# Patient Record
Sex: MALE | Employment: FULL TIME | URBAN - METROPOLITAN AREA
[De-identification: names, ages, dates, MRNs, and addresses within clinical notes are randomized per-mention and may not be internally consistent; named-entity substitution may affect disease eponyms.]

---

## 2017-03-10 ENCOUNTER — HOSPITAL ENCOUNTER (INPATIENT)
Facility: CLINIC | Age: 57
LOS: 5 days | Discharge: HOME | DRG: 885 | End: 2017-03-15
Attending: PSYCHIATRY & NEUROLOGY | Admitting: PSYCHIATRY & NEUROLOGY
Payer: COMMERCIAL

## 2017-03-10 DIAGNOSIS — F33.2 SEVERE RECURRENT MAJOR DEPRESSION WITHOUT PSYCHOTIC FEATURES (H): Primary | ICD-10-CM

## 2017-03-10 DIAGNOSIS — T14.8XXA OPEN WOUND: ICD-10-CM

## 2017-03-10 PROBLEM — R45.851 DEPRESSION WITH SUICIDAL IDEATION: Status: ACTIVE | Noted: 2017-03-10

## 2017-03-10 PROBLEM — F32.A DEPRESSION WITH SUICIDAL IDEATION: Status: ACTIVE | Noted: 2017-03-10

## 2017-03-10 LAB
ANION GAP SERPL CALCULATED.3IONS-SCNC: 7 MMOL/L (ref 3–14)
BASOPHILS # BLD AUTO: 0.1 10E9/L (ref 0–0.2)
BASOPHILS NFR BLD AUTO: 0.6 %
BUN SERPL-MCNC: 16 MG/DL (ref 7–30)
CALCIUM SERPL-MCNC: 9.3 MG/DL (ref 8.5–10.1)
CHLORIDE SERPL-SCNC: 106 MMOL/L (ref 94–109)
CO2 SERPL-SCNC: 29 MMOL/L (ref 20–32)
CREAT SERPL-MCNC: 1.05 MG/DL (ref 0.66–1.25)
DIFFERENTIAL METHOD BLD: ABNORMAL
EOSINOPHIL # BLD AUTO: 0.3 10E9/L (ref 0–0.7)
EOSINOPHIL NFR BLD AUTO: 3.4 %
ERYTHROCYTE [DISTWIDTH] IN BLOOD BY AUTOMATED COUNT: 13.2 % (ref 10–15)
GFR SERPL CREATININE-BSD FRML MDRD: 73 ML/MIN/1.7M2
GLUCOSE SERPL-MCNC: 88 MG/DL (ref 70–99)
HCT VFR BLD AUTO: 38.2 % (ref 40–53)
HGB BLD-MCNC: 12.8 G/DL (ref 13.3–17.7)
IMM GRANULOCYTES # BLD: 0 10E9/L (ref 0–0.4)
IMM GRANULOCYTES NFR BLD: 0.2 %
LYMPHOCYTES # BLD AUTO: 2 10E9/L (ref 0.8–5.3)
LYMPHOCYTES NFR BLD AUTO: 23 %
MCH RBC QN AUTO: 29.2 PG (ref 26.5–33)
MCHC RBC AUTO-ENTMCNC: 33.5 G/DL (ref 31.5–36.5)
MCV RBC AUTO: 87 FL (ref 78–100)
MONOCYTES # BLD AUTO: 0.8 10E9/L (ref 0–1.3)
MONOCYTES NFR BLD AUTO: 9.8 %
NEUTROPHILS # BLD AUTO: 5.3 10E9/L (ref 1.6–8.3)
NEUTROPHILS NFR BLD AUTO: 63 %
NRBC # BLD AUTO: 0 10*3/UL
NRBC BLD AUTO-RTO: 0 /100
PLATELET # BLD AUTO: 208 10E9/L (ref 150–450)
POTASSIUM SERPL-SCNC: 4.1 MMOL/L (ref 3.4–5.3)
RBC # BLD AUTO: 4.39 10E12/L (ref 4.4–5.9)
SODIUM SERPL-SCNC: 142 MMOL/L (ref 133–144)
TSH SERPL DL<=0.005 MIU/L-ACNC: 1.45 MU/L (ref 0.4–4)
WBC # BLD AUTO: 8.5 10E9/L (ref 4–11)

## 2017-03-10 PROCEDURE — 25000132 ZZH RX MED GY IP 250 OP 250 PS 637: Performed by: PSYCHIATRY & NEUROLOGY

## 2017-03-10 PROCEDURE — 84443 ASSAY THYROID STIM HORMONE: CPT | Performed by: PSYCHIATRY & NEUROLOGY

## 2017-03-10 PROCEDURE — 36415 COLL VENOUS BLD VENIPUNCTURE: CPT | Performed by: PSYCHIATRY & NEUROLOGY

## 2017-03-10 PROCEDURE — 83550 IRON BINDING TEST: CPT | Performed by: PSYCHIATRY & NEUROLOGY

## 2017-03-10 PROCEDURE — 12400006 ZZH R&B MH INTERMEDIATE

## 2017-03-10 PROCEDURE — 80048 BASIC METABOLIC PNL TOTAL CA: CPT | Performed by: PSYCHIATRY & NEUROLOGY

## 2017-03-10 PROCEDURE — 99207 ZZC CONSULT E&M CHANGED TO INITIAL LEVEL: CPT | Performed by: INTERNAL MEDICINE

## 2017-03-10 PROCEDURE — 99222 1ST HOSP IP/OBS MODERATE 55: CPT | Performed by: INTERNAL MEDICINE

## 2017-03-10 PROCEDURE — 85025 COMPLETE CBC W/AUTO DIFF WBC: CPT | Performed by: PSYCHIATRY & NEUROLOGY

## 2017-03-10 PROCEDURE — 83540 ASSAY OF IRON: CPT | Performed by: PSYCHIATRY & NEUROLOGY

## 2017-03-10 RX ORDER — CEPHALEXIN 500 MG/1
500 CAPSULE ORAL EVERY 8 HOURS SCHEDULED
Status: DISCONTINUED | OUTPATIENT
Start: 2017-03-10 | End: 2017-03-15 | Stop reason: HOSPADM

## 2017-03-10 RX ORDER — FOLIC ACID 0.8 MG
500 TABLET ORAL DAILY
Status: ON HOLD | COMMUNITY
End: 2019-06-27

## 2017-03-10 RX ORDER — ESZOPICLONE 3 MG/1
3 TABLET, FILM COATED ORAL
Status: DISCONTINUED | OUTPATIENT
Start: 2017-03-10 | End: 2017-03-15 | Stop reason: HOSPADM

## 2017-03-10 RX ORDER — QUETIAPINE FUMARATE 25 MG/1
25 TABLET, FILM COATED ORAL EVERY 4 HOURS PRN
Status: DISCONTINUED | OUTPATIENT
Start: 2017-03-10 | End: 2017-03-15 | Stop reason: HOSPADM

## 2017-03-10 RX ORDER — MIRTAZAPINE 30 MG/1
30 TABLET, FILM COATED ORAL AT BEDTIME
Status: DISCONTINUED | OUTPATIENT
Start: 2017-03-10 | End: 2017-03-15 | Stop reason: HOSPADM

## 2017-03-10 RX ORDER — MAGNESIUM OXIDE 400 MG/1
400 TABLET ORAL DAILY
Status: DISCONTINUED | OUTPATIENT
Start: 2017-03-10 | End: 2017-03-15 | Stop reason: HOSPADM

## 2017-03-10 RX ORDER — FENOFIBRATE 160 MG/1
160 TABLET ORAL DAILY
Status: DISCONTINUED | OUTPATIENT
Start: 2017-03-10 | End: 2017-03-15 | Stop reason: HOSPADM

## 2017-03-10 RX ORDER — POTASSIUM CHLORIDE 1.5 G/1.58G
20 POWDER, FOR SOLUTION ORAL DAILY
Status: ON HOLD | COMMUNITY
End: 2019-06-27

## 2017-03-10 RX ORDER — HYDROCHLOROTHIAZIDE 12.5 MG/1
12.5 CAPSULE ORAL EVERY MORNING
Status: DISCONTINUED | OUTPATIENT
Start: 2017-03-11 | End: 2017-03-15 | Stop reason: HOSPADM

## 2017-03-10 RX ADMIN — VORTIOXETINE 20 MG: 20 TABLET, FILM COATED ORAL at 17:38

## 2017-03-10 RX ADMIN — CEPHALEXIN 500 MG: 500 CAPSULE ORAL at 21:56

## 2017-03-10 RX ADMIN — CEPHALEXIN 500 MG: 500 CAPSULE ORAL at 18:20

## 2017-03-10 RX ADMIN — MAGNESIUM OXIDE TAB 400 MG (241.3 MG ELEMENTAL MG) 400 MG: 400 (241.3 MG) TAB at 21:56

## 2017-03-10 RX ADMIN — FENOFIBRATE 160 MG: 160 TABLET ORAL at 17:38

## 2017-03-10 RX ADMIN — MIRTAZAPINE 30 MG: 30 TABLET, FILM COATED ORAL at 21:56

## 2017-03-10 NOTE — PROGRESS NOTES
Nursing assessment complete including patient and medication profiles. Risk assessments completed addressing suicide,fall,skin,nutrition and safety issues. Care plan initiated. Assessments reviewed with physician and admit orders received. Welcome packet reviewed with patient. Information reviewed includes getting emergency help, preventing infections, understanding your care, using medication safely, reducing falls, preventing pressure ulcers, smoking cessation, powerful choices and Patients Bill of Rights. Pt. given tour of the unit and instruction on use of facility including emergency call light. Program schedule reviewed with patient. Questions regarding the unit addressed. Pt. Search completed and belongings inventoried.

## 2017-03-10 NOTE — IP AVS SNAPSHOT
[place] [address] [place] [address]    Phone:  [phone]                                       After Visit Summary   3/10/2017    Bull Garza    MRN: [id number]           After Visit Summary Signature Page     I have received my discharge instructions, and my questions have been answered. I have discussed any challenges I see with this plan with the nurse or doctor.    ..........................................................................................................................................  Patient/Patient Representative Signature      ..........................................................................................................................................  Patient Representative Print Name and Relationship to Patient    ..................................................               ................................................  Date                                            Time    ..........................................................................................................................................  Reviewed by Signature/Title    ...................................................              ..............................................  Date                                                            Time
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
## 2017-03-10 NOTE — PROGRESS NOTES
03/10/17 1515   Patient Belongings   Did you bring any home meds/supplements to the hospital?  No   Patient Belongings other (see comments)   Disposition of Belongings Put in pt's locker   Belongings Search Yes   Clothing Search Yes   Second Staff Lazaro Sorenson w/string  Socks  Phone  Polo shirt  Jeans  Belt          Admission Signature____________________________             Date_____________      Discharge Signature____________________________             Date_____________

## 2017-03-11 LAB
IRON SATN MFR SERPL: 10 % (ref 15–46)
IRON SERPL-MCNC: 44 UG/DL (ref 35–180)
TIBC SERPL-MCNC: 429 UG/DL (ref 240–430)

## 2017-03-11 PROCEDURE — 12400000 ZZH R&B MH

## 2017-03-11 PROCEDURE — 93005 ELECTROCARDIOGRAM TRACING: CPT

## 2017-03-11 PROCEDURE — 25000132 ZZH RX MED GY IP 250 OP 250 PS 637: Performed by: PSYCHIATRY & NEUROLOGY

## 2017-03-11 PROCEDURE — 25000132 ZZH RX MED GY IP 250 OP 250 PS 637: Performed by: INTERNAL MEDICINE

## 2017-03-11 PROCEDURE — 93010 ELECTROCARDIOGRAM REPORT: CPT | Performed by: INTERNAL MEDICINE

## 2017-03-11 RX ORDER — IBUPROFEN 600 MG/1
600 TABLET, FILM COATED ORAL EVERY 6 HOURS PRN
Status: DISCONTINUED | OUTPATIENT
Start: 2017-03-11 | End: 2017-03-15 | Stop reason: HOSPADM

## 2017-03-11 RX ADMIN — FENOFIBRATE 160 MG: 160 TABLET ORAL at 09:25

## 2017-03-11 RX ADMIN — VORTIOXETINE 20 MG: 20 TABLET, FILM COATED ORAL at 09:25

## 2017-03-11 RX ADMIN — BREXPIPRAZOLE 0.5 MG: 0.5 TABLET ORAL at 16:06

## 2017-03-11 RX ADMIN — MAGNESIUM OXIDE TAB 400 MG (241.3 MG ELEMENTAL MG) 400 MG: 400 (241.3 MG) TAB at 21:22

## 2017-03-11 RX ADMIN — CEPHALEXIN 500 MG: 500 CAPSULE ORAL at 13:25

## 2017-03-11 RX ADMIN — CEPHALEXIN 500 MG: 500 CAPSULE ORAL at 21:22

## 2017-03-11 RX ADMIN — IBUPROFEN 600 MG: 600 TABLET ORAL at 10:23

## 2017-03-11 RX ADMIN — RANITIDINE 150 MG: 150 TABLET ORAL at 21:22

## 2017-03-11 RX ADMIN — RANITIDINE 150 MG: 150 TABLET ORAL at 11:18

## 2017-03-11 RX ADMIN — CEPHALEXIN 500 MG: 500 CAPSULE ORAL at 06:55

## 2017-03-11 RX ADMIN — MIRTAZAPINE 30 MG: 30 TABLET, FILM COATED ORAL at 21:22

## 2017-03-11 RX ADMIN — HYDROCHLOROTHIAZIDE 12.5 MG: 12.5 CAPSULE ORAL at 09:25

## 2017-03-11 NOTE — PLAN OF CARE
"Problem: Depressive Symptoms  Goal: Depressive Symptoms  Signs and symptoms of listed problems will be absent or manageable.   Outcome: No Change  Patient is very isolative.  He is flat, sad and blunt.  He is depressed and states \"I am just really really depressed\".  Not attending groups.  He is cooperative.  Wound on Left leg is dry and red around the area.  Patient states that it is getting better.      "

## 2017-03-11 NOTE — PROGRESS NOTES
Pt was not clear with his PTA meds.  We looked at his My Chart for some clarity.  He reports Potassium Chloride Tameka ERO 20 MEQ oral tablet extended daily for a hx of hypokalemia and leg cramps.  Per MD, wait for his K+ lab before ordering.   He also reported taking a high dose of Magnesium for leg cramps but it was ordered for 400mg daily.  Antibiotic was ordered for his left leg laceration.      Pt also needs to be cleared for ECT.

## 2017-03-11 NOTE — CONSULTS
Hospitalist/Internal Medicine consult dictated (#[id number])    Karlos Mendes MD   March 11, 2017   2494

## 2017-03-11 NOTE — H&P
Pt seen for initial psychiatric evaluation, please see my dictation for details and recommendations. Dr. Hill
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
## 2017-03-11 NOTE — PROGRESS NOTES
Hospitalist follow up:    12-Lead EKG: Reviewed. Sinus bradycardia. Normal axis and intervals. No significant ST-T wave abnormalities or acute ischemic changes.     Patient is medically cleared for ECT.     Karlos Mendes MD   March 11, 2017   [id number]

## 2017-03-12 PROBLEM — F33.2 SEVERE RECURRENT MAJOR DEPRESSION WITHOUT PSYCHOTIC FEATURES (H): Status: ACTIVE | Noted: 2017-03-12

## 2017-03-12 PROCEDURE — 25000132 ZZH RX MED GY IP 250 OP 250 PS 637: Performed by: PSYCHIATRY & NEUROLOGY

## 2017-03-12 PROCEDURE — 12400000 ZZH R&B MH

## 2017-03-12 PROCEDURE — 25000132 ZZH RX MED GY IP 250 OP 250 PS 637: Performed by: INTERNAL MEDICINE

## 2017-03-12 RX ADMIN — MIRTAZAPINE 30 MG: 30 TABLET, FILM COATED ORAL at 21:26

## 2017-03-12 RX ADMIN — CEPHALEXIN 500 MG: 500 CAPSULE ORAL at 13:57

## 2017-03-12 RX ADMIN — CEPHALEXIN 500 MG: 500 CAPSULE ORAL at 21:26

## 2017-03-12 RX ADMIN — CEPHALEXIN 500 MG: 500 CAPSULE ORAL at 07:13

## 2017-03-12 RX ADMIN — RANITIDINE 150 MG: 150 TABLET ORAL at 07:45

## 2017-03-12 RX ADMIN — FENOFIBRATE 160 MG: 160 TABLET ORAL at 07:45

## 2017-03-12 RX ADMIN — BREXPIPRAZOLE 0.5 MG: 0.5 TABLET ORAL at 07:45

## 2017-03-12 RX ADMIN — MAGNESIUM OXIDE TAB 400 MG (241.3 MG ELEMENTAL MG) 400 MG: 400 (241.3 MG) TAB at 21:26

## 2017-03-12 RX ADMIN — VORTIOXETINE 20 MG: 20 TABLET, FILM COATED ORAL at 07:45

## 2017-03-12 RX ADMIN — HYDROCHLOROTHIAZIDE 12.5 MG: 12.5 CAPSULE ORAL at 07:45

## 2017-03-12 RX ADMIN — RANITIDINE 150 MG: 150 TABLET ORAL at 21:26

## 2017-03-12 NOTE — PROGRESS NOTES
BEHAVIORAL HEALTH NUTRITION ASSESSMENT      REASON FOR ASSESSMENT:  Admission screen: Unintentional weight loss of 10# or more in past 2 months  Admitted for depression, note plans for ECT.    CURRENT DIET AND NOURISHMENT ORDER:    Diet: Regular    Current Intake/Tolerance: No intake recorded on the flowsheet. Patient not available/not appropriate to visit with at this time. Patient ordering adequate amounts of food and balanced meals as per review of menus.      ANTHROPOMETRICS:    Height: 6'  Weight: 101.2 kg  BMI: 30.32 kg/m2  IBW: 81 kg +/- 10%  %IBW: 125%  Weight History: No recent wt hx  Wt Readings from Last 10 Encounters:   03/10/17 101.2 kg (223 lb 1.6 oz)   05/27/14 111.7 kg (246 lb 3.2 oz)   05/21/14 111.1 kg (245 lb)   07/22/13 109.8 kg (242 lb)   08/01/11 103 kg (227 lb)   05/19/11 103.9 kg (229 lb)   08/19/10 114.3 kg (252 lb)   08/05/10 116.1 kg (256 lb)   07/07/10 116.1 kg (256 lb)   05/26/10 115.1 kg (253 lb 12.8 oz)         LABS:  Reviewed    NUTRITION STATUS VALIDATION:  Weight status: Obesity Grade I BMI 30-34.9    INTERVENTION:    Nutrition Diagnosis:  No nutrition diagnosis at this time.    Implementation:   Nutrition education: Per MD order if appropriate    Follow Up/Monitoring:   No need for further follow-up unless another consult received.    Elena Donis RD  Pager [phone] (M-F)            [phone] (W/E & Hol)

## 2017-03-12 NOTE — PLAN OF CARE
Problem: Depressive Symptoms  Goal: Depressive Symptoms  Signs and symptoms of listed problems will be absent or manageable.   Outcome: No Change  Patient is very depressed.  He did volunteer on his own to take a shower.  Mainly spends time in his beds and does not attend group.  Covered wound with telfa after his shower and ordered a wound consult as the wound is deep.  Redness is decreasing. Area around the wound is hard.

## 2017-03-13 ENCOUNTER — ANESTHESIA (OUTPATIENT)
Dept: SURGERY | Facility: CLINIC | Age: 57
End: 2017-03-13

## 2017-03-13 ENCOUNTER — ANESTHESIA EVENT (OUTPATIENT)
Dept: SURGERY | Facility: CLINIC | Age: 57
End: 2017-03-13

## 2017-03-13 PROCEDURE — 25800025 ZZH RX 258: Performed by: ANESTHESIOLOGY

## 2017-03-13 PROCEDURE — 99212 OFFICE O/P EST SF 10 MIN: CPT

## 2017-03-13 PROCEDURE — 25000128 H RX IP 250 OP 636: Performed by: PSYCHIATRY & NEUROLOGY

## 2017-03-13 PROCEDURE — 25000132 ZZH RX MED GY IP 250 OP 250 PS 637: Performed by: PSYCHIATRY & NEUROLOGY

## 2017-03-13 PROCEDURE — 40000671 ZZH STATISTIC ANESTHESIA CASE

## 2017-03-13 PROCEDURE — 25000125 ZZHC RX 250: Performed by: ANESTHESIOLOGY

## 2017-03-13 PROCEDURE — 12400006 ZZH R&B MH INTERMEDIATE

## 2017-03-13 PROCEDURE — 25000125 ZZHC RX 250: Performed by: NURSE ANESTHETIST, CERTIFIED REGISTERED

## 2017-03-13 PROCEDURE — 25000132 ZZH RX MED GY IP 250 OP 250 PS 637: Performed by: INTERNAL MEDICINE

## 2017-03-13 PROCEDURE — 90870 ELECTROCONVULSIVE THERAPY: CPT

## 2017-03-13 RX ORDER — ONDANSETRON 2 MG/ML
4 INJECTION INTRAMUSCULAR; INTRAVENOUS EVERY 6 HOURS PRN
Status: DISCONTINUED | OUTPATIENT
Start: 2017-03-13 | End: 2017-03-15 | Stop reason: HOSPADM

## 2017-03-13 RX ORDER — KETOROLAC TROMETHAMINE 30 MG/ML
30 INJECTION, SOLUTION INTRAMUSCULAR; INTRAVENOUS EVERY 6 HOURS PRN
Status: DISCONTINUED | OUTPATIENT
Start: 2017-03-13 | End: 2017-03-15 | Stop reason: HOSPADM

## 2017-03-13 RX ORDER — ACETAMINOPHEN 325 MG/1
650 TABLET ORAL EVERY 4 HOURS PRN
Status: DISCONTINUED | OUTPATIENT
Start: 2017-03-13 | End: 2017-03-15 | Stop reason: HOSPADM

## 2017-03-13 RX ORDER — SODIUM CHLORIDE, SODIUM LACTATE, POTASSIUM CHLORIDE, CALCIUM CHLORIDE 600; 310; 30; 20 MG/100ML; MG/100ML; MG/100ML; MG/100ML
500 INJECTION, SOLUTION INTRAVENOUS CONTINUOUS
Status: DISCONTINUED | OUTPATIENT
Start: 2017-03-13 | End: 2017-03-13

## 2017-03-13 RX ADMIN — BREXPIPRAZOLE 0.5 MG: 0.5 TABLET ORAL at 08:41

## 2017-03-13 RX ADMIN — CEPHALEXIN 500 MG: 500 CAPSULE ORAL at 15:05

## 2017-03-13 RX ADMIN — RANITIDINE 150 MG: 150 TABLET ORAL at 21:34

## 2017-03-13 RX ADMIN — ACETAMINOPHEN 650 MG: 325 TABLET, FILM COATED ORAL at 12:08

## 2017-03-13 RX ADMIN — HYDROCHLOROTHIAZIDE 12.5 MG: 12.5 CAPSULE ORAL at 08:42

## 2017-03-13 RX ADMIN — KETOROLAC TROMETHAMINE 30 MG: 30 INJECTION, SOLUTION INTRAMUSCULAR at 06:30

## 2017-03-13 RX ADMIN — ONDANSETRON 4 MG: 2 INJECTION INTRAMUSCULAR; INTRAVENOUS at 06:30

## 2017-03-13 RX ADMIN — MAGNESIUM OXIDE TAB 400 MG (241.3 MG ELEMENTAL MG) 400 MG: 400 (241.3 MG) TAB at 21:34

## 2017-03-13 RX ADMIN — VORTIOXETINE 20 MG: 20 TABLET, FILM COATED ORAL at 08:42

## 2017-03-13 RX ADMIN — MIRTAZAPINE 30 MG: 30 TABLET, FILM COATED ORAL at 21:34

## 2017-03-13 RX ADMIN — SUCCINYLCHOLINE CHLORIDE 100 MG: 20 INJECTION, SOLUTION INTRAMUSCULAR; INTRAVENOUS at 07:31

## 2017-03-13 RX ADMIN — RANITIDINE 150 MG: 150 TABLET ORAL at 08:41

## 2017-03-13 RX ADMIN — CEPHALEXIN 500 MG: 500 CAPSULE ORAL at 08:42

## 2017-03-13 RX ADMIN — CEPHALEXIN 500 MG: 500 CAPSULE ORAL at 21:34

## 2017-03-13 RX ADMIN — LIDOCAINE HYDROCHLORIDE 1 ML: 10 INJECTION, SOLUTION EPIDURAL; INFILTRATION; INTRACAUDAL; PERINEURAL at 06:15

## 2017-03-13 RX ADMIN — METHOHEXITAL SODIUM 120 MG: 500 INJECTION, POWDER, LYOPHILIZED, FOR SOLUTION INTRAMUSCULAR; INTRAVENOUS; RECTAL at 07:31

## 2017-03-13 RX ADMIN — IBUPROFEN 600 MG: 600 TABLET ORAL at 13:15

## 2017-03-13 RX ADMIN — FENOFIBRATE 160 MG: 160 TABLET ORAL at 08:41

## 2017-03-13 RX ADMIN — SODIUM CHLORIDE, POTASSIUM CHLORIDE, SODIUM LACTATE AND CALCIUM CHLORIDE 500 ML: 600; 310; 30; 20 INJECTION, SOLUTION INTRAVENOUS at 06:15

## 2017-03-13 ASSESSMENT — LIFESTYLE VARIABLES: TOBACCO_USE: 0

## 2017-03-13 ASSESSMENT — ENCOUNTER SYMPTOMS: SEIZURES: 0
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
## 2017-03-13 NOTE — ANESTHESIA POSTPROCEDURE EVALUATION
Patient: Bull Garza    * No procedures listed *    Diagnosis:* No pre-op diagnosis entered *  Diagnosis Additional Information: No value filed.    Anesthesia Type:  General    Note:  Anesthesia Post Evaluation    Patient location during evaluation: PACU  Patient participation: Able to fully participate in evaluation  Level of consciousness: awake  Airway patency: patent  Cardiovascular status: acceptable  Respiratory status: acceptable  Hydration status: acceptable     Anesthetic complications: None          Last vitals:  Vitals:    03/13/17 0810 03/13/17 0815 03/13/17 0820   BP: 143/81 141/76 132/69   Pulse:      Resp: 12 16 11   Temp:      SpO2: 93% 94% 94%         Electronically Signed By: Rebecca Saeed  March 13, 2017  2:52 PM

## 2017-03-13 NOTE — PROCEDURES
[facility] ECT Procedure Note     Bull Garza [id number]   56 year old 1960     Patient Status: Inpatient    Allergies   Allergen Reactions     Nka [No Known Allergies]        Weight:  223 lbs 1.6 oz              Diagnosis:   Major depression       Indications for ECT:   Medications ineffective       Pause for the Cause:     Right patient Yes   Right procedure/laterality settings: Yes   Right diagnosis Yes          Intra-Procedure Documentation:     Date:  3/13/2017  Time:  6:31 AM    ECT #    Treatment number this series: 1   Total treatment number: 7   Type of ECT:  Bilateral, standard    ECT Medications administered: Brevital: 120mg  Succinyl Choline: 100mg         Clinical Narrative:     ECT was administered by Thymatron machine.  Pt has been medically cleared for procedure, consent signed. Side effects, Risks and benefits reviewed.    ECT Strip Summary:   Energy Level: 50 percent  Motor Seizure Duration: 30 seconds  EEG Seizure Duration: 30 seconds    Complications: No    Plan: 2nd ECT 3/15/17  Outpatient Psychiatrist: Dr Hill
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
## 2017-03-13 NOTE — ANESTHESIA PREPROCEDURE EVALUATION
Anesthesia Evaluation     . Pt has had prior anesthetic.     No history of anesthetic complications     ROS/MED HX    ENT/Pulmonary:     (+)sleep apnea, doesn't use CPAP , . .   (-) tobacco use   Neurologic: Comment: Hx of tbi     (-) seizures and CVA   Cardiovascular:     (+) hypertension----. : . . . :. Irregular Heartbeat/Palpitations (hx of ablation for atrial tachycardia), .       METS/Exercise Tolerance:     Hematologic:         Musculoskeletal:         GI/Hepatic:  - neg GI/hepatic ROS       Renal/Genitourinary:         Endo:      (-) Type II DM and thyroid disease   Psychiatric:         Infectious Disease:         Malignancy:         Other:               Physical Exam  Normal systems: dental    Airway   Mallampati: I  TM distance: >3 FB  Neck ROM: full    Dental     Cardiovascular   Rhythm and rate: regular      Pulmonary    breath sounds clear to auscultation                    Anesthesia Plan      History & Physical Review  History and physical reviewed and following examination; no interval change.    ASA Status:  2 .        Plan for General with Intravenous induction.          Postoperative Care  Postoperative pain management:  IV analgesics.      Consents  Anesthetic plan, risks, benefits and alternatives discussed with:  Patient..                          .

## 2017-03-13 NOTE — ANESTHESIA CARE TRANSFER NOTE
Patient: Bull Garza    * No procedures listed *    Diagnosis: * No pre-op diagnosis entered *  Diagnosis Additional Information: No value filed.    Anesthesia Type:   General     Note:  Airway :Nasal Cannula  Patient transferred to:PACU  Comments: Transferred to PACU, spontaneous respirations, 4L oxygen via nasal cannula.  All monitors and alarms on and functioning, VSS.  Patient awake, comfortable.  Report to PACU RN.      Vitals: (Last set prior to Anesthesia Care Transfer)    CRNA VITALS  3/13/2017 0714 - 3/13/2017 0744      3/13/2017             NIBP: (!)  194/95    Pulse: 111    NIBP Mean: 118    SpO2: (!)  99 %    Resp Rate (observed): (!)  12    Resp Rate (set): 10                Electronically Signed By: TRISHA Klein CRNA  March 13, 2017  7:44 AM
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
## 2017-03-13 NOTE — PLAN OF CARE
Problem: Depressive Symptoms  Goal: Depressive Symptoms  Signs and symptoms of listed problems will be absent or manageable.   Outcome: Improving  Patient had 1st ECT today.  Complaining of a headache and did take tylenol, Ibuprofen and using an ice pack.  Spending most of the day in bed.

## 2017-03-14 LAB — INTERPRETATION ECG - MUSE: NORMAL

## 2017-03-14 PROCEDURE — 97150 GROUP THERAPEUTIC PROCEDURES: CPT | Mod: GO

## 2017-03-14 PROCEDURE — 12400006 ZZH R&B MH INTERMEDIATE

## 2017-03-14 PROCEDURE — 25000132 ZZH RX MED GY IP 250 OP 250 PS 637: Performed by: PSYCHIATRY & NEUROLOGY

## 2017-03-14 PROCEDURE — 90853 GROUP PSYCHOTHERAPY: CPT

## 2017-03-14 PROCEDURE — 25000132 ZZH RX MED GY IP 250 OP 250 PS 637: Performed by: INTERNAL MEDICINE

## 2017-03-14 RX ADMIN — RANITIDINE 150 MG: 150 TABLET ORAL at 21:19

## 2017-03-14 RX ADMIN — CEPHALEXIN 500 MG: 500 CAPSULE ORAL at 14:44

## 2017-03-14 RX ADMIN — CEPHALEXIN 500 MG: 500 CAPSULE ORAL at 06:04

## 2017-03-14 RX ADMIN — FENOFIBRATE 160 MG: 160 TABLET ORAL at 08:06

## 2017-03-14 RX ADMIN — VORTIOXETINE 20 MG: 20 TABLET, FILM COATED ORAL at 08:07

## 2017-03-14 RX ADMIN — RANITIDINE 150 MG: 150 TABLET ORAL at 08:06

## 2017-03-14 RX ADMIN — BREXPIPRAZOLE 0.5 MG: 0.5 TABLET ORAL at 08:06

## 2017-03-14 RX ADMIN — MIRTAZAPINE 30 MG: 30 TABLET, FILM COATED ORAL at 21:19

## 2017-03-14 RX ADMIN — MAGNESIUM OXIDE TAB 400 MG (241.3 MG ELEMENTAL MG) 400 MG: 400 (241.3 MG) TAB at 21:19

## 2017-03-14 RX ADMIN — CEPHALEXIN 500 MG: 500 CAPSULE ORAL at 21:19

## 2017-03-14 RX ADMIN — HYDROCHLOROTHIAZIDE 12.5 MG: 12.5 CAPSULE ORAL at 08:06

## 2017-03-14 NOTE — PLAN OF CARE
Problem: Depressive Symptoms  Goal: Depressive Symptoms  Signs and symptoms of listed problems will be absent or manageable.   Outcome: Improving  Pleasant and cooperative. Spent the beginning of the shift bed resting.  Later spent time in the lounge watching tv.  Said he was feeling better this evening.  He reported ECT going OK but he c/o a HA and feeling tired.  Discussed experiencing pent up frustration and anger.  He felt that is was so bead the littlest thing would set him off and felt as though he should take it out on someone, typically himself.  He also felt like he should punish himself.  He was disappointed he waited so long to address his issues but talked about not being able to get a med in time d/t insurance issues.  Pt realized today that he actual needs to be here.  Cont to appear depressed.

## 2017-03-14 NOTE — PROGRESS NOTES
"Wound care performed @ 1100. Patient tolerated procedure no complaints of pain. Periwound cleaned with MicroKlenz, packed with 2\" of idoform packing guaze, and covered with Mepilex. Assessed wound, scant amount of serosanguinous drainage, bruising and scarring around the wound, pt. Stated that he showered this morning and got the dressing wet, encouraged patient to notify nurse when showering so that dressing change can be performed immediately after shower.   "

## 2017-03-14 NOTE — PLAN OF CARE
Problem: Depressive Symptoms  Goal: Depressive Symptoms  Signs and symptoms of listed problems will be absent or manageable.   Outcome: Therapy, progress toward functional goals is gradual  Pt is attending groups. Showered. Keeps to self, not social. Eating well, Visible on the unit. Appears sad, depressed,calm.

## 2017-03-14 NOTE — PLAN OF CARE
Problem: Goal Outcome Summary  Goal: Goal Outcome Summary  OT: Pt has not attended OT groups as of this date. Pt isolated to room primarily. Pt observed reading on bed and well-groomed.

## 2017-03-14 NOTE — PROGRESS NOTES
[facility] Psychiatric Progress Note       Interim History   The patient's care was discussed with the treatment team and chart notes were reviewed. Pt seen on SDU. Tolerating medications without side effects. Side effects, risks, and benefits of medications reviewed with patient. Pt endorsed having a headache later in the day from ECT yesterday. Dr. Hill informed him that this will subside with subsequent treatments. Pt appears slightly less depressed and anxious, able to smile more often. Pt would like to continue his ECT course outpatient as his wife is willing to take him in the mornings. Will reassess after 2nd ECT tomorrow if he is able to do this. Plan for second ECT on 3/15/17.   Medications     Current Facility-Administered Medications:    ranitidine  150 mg Oral BID     brexpiprazole  0.5 mg Oral Daily     fenofibrate  160 mg Oral Daily     hydrochlorothiazide  12.5 mg Oral QAM     magnesium oxide  400 mg Oral Daily     mirtazapine  30 mg Oral At Bedtime     vortioxetine  20 mg Oral Daily     cephalexin  500 mg Oral Q8H KELLIE     PRNs:  ketorolac, ondansetron, lidocaine, acetaminophen, ibuprofen, eszopiclone (LUNESTA) tablet 3 mg, QUEtiapine      Allergies      Allergies   Allergen Reactions     Nka [No Known Allergies]         Medical Review of Systems   /66  Pulse 80  Temp 98.3  F (36.8  C) (Oral)  Resp 16  Ht 1.829 m (6')  Wt 101.2 kg (223 lb 1.6 oz)  SpO2 94%  BMI 30.26 kg/m2  Body mass index is 30.26 kg/(m^2).  A 10-point review of systems was performed by Dr. Hill and is negative, no new findings.      Psychiatric Examination     Appearance Lying in bed, dressed in scrubs. Appears stated age.   Attitude Cooperative   Orientation Oriented to person, place, time   Eye Contact Poor   Speech Regular rate, rhythm, volume and tone   Language Normal   Psychomotor Behavior Normal   Mood Slighly less depressed   Affect Less flat   Thought Process Goal-Oriented, Intact    Associations Intact   Thought Content Patient is currently negative for suicide ideation, negative for plan or intent, able to contract no self harm and identify barriers to suicide.  Negative for obsessions, compulsions or psychosis.      Fund of Knowledge Average   Insight Impaired   Judgement Slightly improved   Attention Span & Concentration Intact   Recent & Remote Memory Intact   Gait Normal        Labs   Labs reviewed.  No results found for this or any previous visit (from the past 24 hour(s)).       Impression   Mr. Garza is a 56-year-old   male with a long history of depression and borderline personality disorder who had stopped his medications, which have been effective for several years. He had been on Viibryd, Trintellix, Remeron, Seroquel and Lunesta. The patient came in saying he was irritable and explosive, neglected to tell doctor that he had stopped all of his meds except for Trintellix. The patient was prescribed Depakote for irritability; he never took it. Came back to Dr. Hill's office suicidal, was then referred for direct admission to [facility] by Dr. Hill and clear for ECT, has had effective treatment in the past.    Diagnoses   1. Major depression, recurrent, severe, without psychotic features.  2. Borderline Personality Disorder.     Plan     1. Explained side effects, benefits, and complications of medications to the patient, Pt gave verbal consent.  2. Medication changes: None.  3. Discussed treatment plan with patient and team.  4. Projected length of stay: Until ECT course has completed.  5. Plan for second ECT on 3/15/17.    Attestation:   Patient has been seen and evaluated by me, Jose Hill MD.    Patient ID:  Name: Bull Garza  MRN: [id number]  Admission: 3/10/2017   YOB: 1960

## 2017-03-15 ENCOUNTER — ANESTHESIA EVENT (OUTPATIENT)
Dept: SURGERY | Facility: CLINIC | Age: 57
End: 2017-03-15

## 2017-03-15 ENCOUNTER — ANESTHESIA (OUTPATIENT)
Dept: SURGERY | Facility: CLINIC | Age: 57
End: 2017-03-15

## 2017-03-15 VITALS
WEIGHT: 222 LBS | DIASTOLIC BLOOD PRESSURE: 67 MMHG | BODY MASS INDEX: 30.07 KG/M2 | HEIGHT: 72 IN | RESPIRATION RATE: 16 BRPM | SYSTOLIC BLOOD PRESSURE: 123 MMHG | TEMPERATURE: 98.5 F | OXYGEN SATURATION: 95 % | HEART RATE: 52 BPM

## 2017-03-15 PROCEDURE — 25000128 H RX IP 250 OP 636: Performed by: NURSE ANESTHETIST, CERTIFIED REGISTERED

## 2017-03-15 PROCEDURE — 90870 ELECTROCONVULSIVE THERAPY: CPT

## 2017-03-15 PROCEDURE — 25000125 ZZHC RX 250: Performed by: NURSE ANESTHETIST, CERTIFIED REGISTERED

## 2017-03-15 PROCEDURE — 25800025 ZZH RX 258: Performed by: ANESTHESIOLOGY

## 2017-03-15 PROCEDURE — 25000128 H RX IP 250 OP 636: Performed by: PSYCHIATRY & NEUROLOGY

## 2017-03-15 PROCEDURE — 25000132 ZZH RX MED GY IP 250 OP 250 PS 637: Performed by: PSYCHIATRY & NEUROLOGY

## 2017-03-15 PROCEDURE — 25000132 ZZH RX MED GY IP 250 OP 250 PS 637: Performed by: INTERNAL MEDICINE

## 2017-03-15 PROCEDURE — GZB2ZZZ ELECTROCONVULSIVE THERAPY, BILATERAL-SINGLE SEIZURE: ICD-10-PCS | Performed by: PSYCHIATRY & NEUROLOGY

## 2017-03-15 PROCEDURE — 40000671 ZZH STATISTIC ANESTHESIA CASE

## 2017-03-15 RX ORDER — LABETALOL HYDROCHLORIDE 5 MG/ML
INJECTION, SOLUTION INTRAVENOUS PRN
Status: DISCONTINUED | OUTPATIENT
Start: 2017-03-15 | End: 2017-03-15

## 2017-03-15 RX ORDER — ONDANSETRON 2 MG/ML
4 INJECTION INTRAMUSCULAR; INTRAVENOUS EVERY 6 HOURS PRN
Status: DISCONTINUED | OUTPATIENT
Start: 2017-03-15 | End: 2017-03-15 | Stop reason: HOSPADM

## 2017-03-15 RX ORDER — ONDANSETRON 2 MG/ML
4 INJECTION INTRAMUSCULAR; INTRAVENOUS EVERY 6 HOURS PRN
Status: CANCELLED
Start: 2017-03-15

## 2017-03-15 RX ORDER — KETOROLAC TROMETHAMINE 30 MG/ML
30 INJECTION, SOLUTION INTRAMUSCULAR; INTRAVENOUS EVERY 6 HOURS PRN
Status: DISCONTINUED | OUTPATIENT
Start: 2017-03-15 | End: 2017-03-15 | Stop reason: HOSPADM

## 2017-03-15 RX ORDER — SODIUM CHLORIDE, SODIUM LACTATE, POTASSIUM CHLORIDE, CALCIUM CHLORIDE 600; 310; 30; 20 MG/100ML; MG/100ML; MG/100ML; MG/100ML
INJECTION, SOLUTION INTRAVENOUS ONCE
Status: COMPLETED | OUTPATIENT
Start: 2017-03-15 | End: 2017-03-15

## 2017-03-15 RX ORDER — CEPHALEXIN 500 MG/1
500 CAPSULE ORAL EVERY 8 HOURS
Qty: 5 CAPSULE | Refills: 0 | Status: ON HOLD | OUTPATIENT
Start: 2017-03-15 | End: 2019-06-27

## 2017-03-15 RX ORDER — KETOROLAC TROMETHAMINE 30 MG/ML
30 INJECTION, SOLUTION INTRAMUSCULAR; INTRAVENOUS EVERY 6 HOURS PRN
Status: CANCELLED
Start: 2017-03-15

## 2017-03-15 RX ADMIN — ONDANSETRON 4 MG: 2 INJECTION INTRAMUSCULAR; INTRAVENOUS at 06:21

## 2017-03-15 RX ADMIN — SUCCINYLCHOLINE CHLORIDE 100 MG: 20 INJECTION, SOLUTION INTRAMUSCULAR; INTRAVENOUS at 06:47

## 2017-03-15 RX ADMIN — HYDROCHLOROTHIAZIDE 12.5 MG: 12.5 CAPSULE ORAL at 08:01

## 2017-03-15 RX ADMIN — METHOHEXITAL SODIUM 120 MG: 500 INJECTION, POWDER, LYOPHILIZED, FOR SOLUTION INTRAMUSCULAR; INTRAVENOUS; RECTAL at 06:47

## 2017-03-15 RX ADMIN — LABETALOL HYDROCHLORIDE 25 MG: 5 INJECTION, SOLUTION INTRAVENOUS at 06:54

## 2017-03-15 RX ADMIN — VORTIOXETINE 20 MG: 20 TABLET, FILM COATED ORAL at 08:01

## 2017-03-15 RX ADMIN — KETOROLAC TROMETHAMINE 30 MG: 30 INJECTION, SOLUTION INTRAMUSCULAR at 06:21

## 2017-03-15 RX ADMIN — SODIUM CHLORIDE, POTASSIUM CHLORIDE, SODIUM LACTATE AND CALCIUM CHLORIDE: 600; 310; 30; 20 INJECTION, SOLUTION INTRAVENOUS at 06:44

## 2017-03-15 RX ADMIN — RANITIDINE 150 MG: 150 TABLET ORAL at 08:01

## 2017-03-15 RX ADMIN — BREXPIPRAZOLE 0.5 MG: 0.5 TABLET ORAL at 08:01

## 2017-03-15 RX ADMIN — FENOFIBRATE 160 MG: 160 TABLET ORAL at 08:01

## 2017-03-15 ASSESSMENT — ENCOUNTER SYMPTOMS: SEIZURES: 0

## 2017-03-15 ASSESSMENT — LIFESTYLE VARIABLES: TOBACCO_USE: 0

## 2017-03-15 NOTE — ANESTHESIA PREPROCEDURE EVALUATION
Anesthesia Evaluation     . Pt has had prior anesthetic.     No history of anesthetic complications     ROS/MED HX    ENT/Pulmonary:     (+)sleep apnea, doesn't use CPAP , . .   (-) tobacco use   Neurologic: Comment: Hx of tbi     (-) seizures and CVA   Cardiovascular:     (+) hypertension----. : . . . :. Irregular Heartbeat/Palpitations (hx of ablation for atrial tachycardia), .       METS/Exercise Tolerance:     Hematologic:         Musculoskeletal:         GI/Hepatic:  - neg GI/hepatic ROS       Renal/Genitourinary:         Endo:      (-) Type II DM and thyroid disease   Psychiatric:         Infectious Disease:         Malignancy:         Other:               Physical Exam  Normal systems: dental    Airway   Mallampati: I  TM distance: >3 FB  Neck ROM: full    Dental     Cardiovascular   Rhythm and rate: regular      Pulmonary    breath sounds clear to auscultation                        Anesthesia Plan      History & Physical Review  History and physical reviewed and following examination; no interval change.    ASA Status:  2 .    NPO Status:  > 8 hours    Plan for General with Intravenous induction.          Postoperative Care  Postoperative pain management:  IV analgesics.      Consents  Anesthetic plan, risks, benefits and alternatives discussed with:  Patient..                          .

## 2017-03-15 NOTE — PROGRESS NOTES
[facility] Psychiatric Progress Note       Interim History   The patient's care was discussed with the treatment team and chart notes were reviewed. Pt seen on SDU. Tolerating medications without side effects. Side effects, risks, and benefits of medications reviewed with patient. Pt had his second ECT this morning, no complications. Plan for third ECT on 3/17/17. He endorsed having less of a headache as well. Pt discussed with his wife having outpatient treatment, she is agreement with this. He has tolerated Rexulti well. Increase to 1mg qam. He has been attending groups appropriately, although he is withdrawn. Denies suicidal or homicidal ideation. Pt to discharge today. He will continue ECT outpatient. Pt to follow-up with Dr. Hill at [facility] within 4 weeks.     Medications     Current Facility-Administered Medications:    lidocaine  0.5 mg Subcutaneous Once     ranitidine  150 mg Oral BID     brexpiprazole  0.5 mg Oral Daily     fenofibrate  160 mg Oral Daily     hydrochlorothiazide  12.5 mg Oral QAM     magnesium oxide  400 mg Oral Daily     mirtazapine  30 mg Oral At Bedtime     vortioxetine  20 mg Oral Daily     cephalexin  500 mg Oral Q8H KELLIE     PRNs:  ketorolac, ondansetron, ketorolac, ondansetron, lidocaine, acetaminophen, ibuprofen, eszopiclone (LUNESTA) tablet 3 mg, QUEtiapine      Allergies      Allergies   Allergen Reactions     Nka [No Known Allergies]         Medical Review of Systems   /67  Pulse 52  Temp 98.5  F (36.9  C) (Oral)  Resp 16  Ht 1.829 m (6')  Wt 100.7 kg (222 lb)  SpO2 95%  BMI 30.11 kg/m2  Body mass index is 30.11 kg/(m^2).  A 10-point review of systems was performed by Dr. Hill and is negative, no new findings.      Psychiatric Examination     Appearance Sitting in chair, dressed in scrubs. Appears stated age.   Attitude Cooperative   Orientation Oriented to person, place, time   Eye Contact Fair   Speech Regular rate, rhythm, volume  and tone   Language Normal   Psychomotor Behavior Normal   Mood Less depressed   Affect Less flat   Thought Process Goal-Oriented, Intact   Associations Intact   Thought Content Patient is currently negative for suicide ideation, negative for plan or intent, able to contract no self harm and identify barriers to suicide.  Negative for obsessions, compulsions or psychosis.      Fund of Knowledge Average   Insight Improving   Judgement Improving   Attention Span & Concentration Intact   Recent & Remote Memory Intact   Gait Normal        Labs   Labs reviewed.  No results found for this or any previous visit (from the past 24 hour(s)).       Impression   Mr. Garza is a 56-year-old   male with a long history of depression and borderline personality disorder who had stopped his medications, which have been effective for several years. He had been on Viibryd, Trintellix, Remeron, Seroquel and Lunesta. The patient came in saying he was irritable and explosive, neglected to tell doctor that he had stopped all of his meds except for Trintellix. The patient was prescribed Depakote for irritability; he never took it. Came back to Dr. Hill's office suicidal, was then referred for direct admission to [facility] by Dr. Hill and clear for ECT, has had effective treatment in the past.    Diagnoses   1. Major depression, recurrent, severe, without psychotic features.  2. Borderline Personality Disorder.     Plan     1. Explained side effects, benefits, and complications of medications to the patient, Pt gave verbal consent.  2. Medication changes: Increase Rexulti to 1mg qam.  3. Discussed treatment plan with patient and team.  4. Projected length of stay: Pt to discharge today.  5. Plan for second ECT on 3/15/17.  6. Pt to follow-up with Dr. Hill at [facility] within 4 weeks.      Attestation:   Patient has been seen and evaluated by me, Jose Hill MD.    Patient  ID:  Name: Bull Garza  MRN: [id number]  Admission: 3/10/2017   YOB: 1960

## 2017-03-15 NOTE — PROCEDURES
[facility] ECT Procedure Note     Bull Garza [id number]   56 year old 1960     Patient Status: Inpatient    Allergies   Allergen Reactions     Nka [No Known Allergies]        Weight:  223 lbs 1.6 oz              Diagnosis:   Major depression       Indications for ECT:   Medications ineffective       Pause for the Cause:     Right patient Yes   Right procedure/laterality settings: Yes   Right diagnosis Yes          Intra-Procedure Documentation:     Date:  3/15/2017  Time:  6:31 AM    ECT #    Treatment number this series: 2   Total treatment number: 8   Type of ECT:  Bilateral, standard    ECT Medications administered: Brevital: 120mg  Succinyl Choline: 100mg         Clinical Narrative:     ECT was administered by Thymatron machine.  Pt has been medically cleared for procedure, consent signed. Side effects, Risks and benefits reviewed.    ECT Strip Summary:   Energy Level: 60 percent  Motor Seizure Duration: 30 seconds  EEG Seizure Duration: 30 seconds    Complications: No    Plan: 3rd ECT 3/17/17  Outpatient Psychiatrist: Dr Hill

## 2017-03-15 NOTE — ANESTHESIA CARE TRANSFER NOTE
Patient: Bull Garza    * No procedures listed *    Diagnosis: * No pre-op diagnosis entered *  Diagnosis Additional Information: No value filed.    Anesthesia Type:   General     Note:  Airway :Nasal Cannula  Patient transferred to:PACU  Comments: Transferred to PACU, spontaneous respirations, 4L oxygen via nasal cannula.  All monitors and alarms on and functioning, VSS.  Patient awake, comfortable.  Report to PACU RN.      Vitals: (Last set prior to Anesthesia Care Transfer)    CRNA VITALS  3/15/2017 0630 - 3/15/2017 0701      3/15/2017             SpO2: 96 %    Resp Rate (observed): 15    Resp Rate (set): 10    EKG: Sinus bradycardia                Electronically Signed By: TRISHA Klein CRNA  March 15, 2017  7:01 AM
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
## 2017-03-15 NOTE — PROGRESS NOTES
Patient denies suicidal ideation.  Patient and his wife have a copy of discharge instructions and verbalize understanding of them.  Sent home with ECT outpatient instructions.  Sent home with dressings for dressing change and the antibiotics for 5 more doses to equal 7 Days.  Instructed the wife and patient to set up an appointment at the primary care MD for Friday or early next week.  Outpatient ECT is set up.  Discharged to home with wife at 1320.  They will  meds at Dr. Hill's office.

## 2017-03-15 NOTE — PLAN OF CARE
Problem: Depressive Symptoms  Goal: Depressive Symptoms  Signs and symptoms of listed problems will be absent or manageable.   Pt attended group and participated well. He laughed in group and said he is feeling better and more hopeful. Pt described the frustrations and inequities in his job. 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 May try to find a new job. Encouraged him to look for a therapist. Talked briefly about some of the things he might learn if an IOP was suggested for him. Pt said he has a good support system. Denies suicidal ideation. Full range affect. Mood remains depressed.

## 2017-03-15 NOTE — PHARMACY
Rexulti requires PA - sending today, will be covered. Please send with samples.    Trintellix - $70 / month no PA required - Please send RX and we will fill    Thank you,  Beny Hebert Ronit  [facility] Discharge Pharmacy Liaison  [phone]

## 2017-03-16 ENCOUNTER — TELEPHONE (OUTPATIENT)
Dept: FAMILY MEDICINE | Facility: CLINIC | Age: 57
End: 2017-03-16

## 2017-03-16 NOTE — TELEPHONE ENCOUNTER
Routing to care coordination.   Germaine Figueroa RN   [facility] - Triage

## 2017-03-16 NOTE — TELEPHONE ENCOUNTER
Pt was discharged from [facility] on 3/15/17 after being treated for severe recurrent major depression without psychotic features. Please call the pt. Thank you.  Aleshia Amaya,

## 2017-03-17 ENCOUNTER — HOSPITAL ENCOUNTER (OUTPATIENT)
Dept: SURGERY | Facility: CLINIC | Age: 57
Discharge: HOME | End: 2017-03-17
Attending: PSYCHIATRY & NEUROLOGY | Admitting: PSYCHIATRY & NEUROLOGY
Payer: COMMERCIAL

## 2017-03-17 ENCOUNTER — ANESTHESIA EVENT (OUTPATIENT)
Dept: SURGERY | Facility: CLINIC | Age: 57
End: 2017-03-17

## 2017-03-17 ENCOUNTER — ANESTHESIA (OUTPATIENT)
Dept: SURGERY | Facility: CLINIC | Age: 57
End: 2017-03-17

## 2017-03-17 ENCOUNTER — CARE COORDINATION (OUTPATIENT)
Dept: CARE COORDINATION | Facility: CLINIC | Age: 57
End: 2017-03-17

## 2017-03-17 VITALS
RESPIRATION RATE: 18 BRPM | DIASTOLIC BLOOD PRESSURE: 83 MMHG | SYSTOLIC BLOOD PRESSURE: 122 MMHG | OXYGEN SATURATION: 96 %

## 2017-03-17 DIAGNOSIS — F33.2 SEVERE RECURRENT MAJOR DEPRESSION WITHOUT PSYCHOTIC FEATURES (H): ICD-10-CM

## 2017-03-17 PROCEDURE — 90870 ELECTROCONVULSIVE THERAPY: CPT

## 2017-03-17 PROCEDURE — 25000128 H RX IP 250 OP 636: Performed by: PSYCHIATRY & NEUROLOGY

## 2017-03-17 PROCEDURE — 40000010 ZZH STATISTIC ANES STAT CODE-CRNA PER MINUTE

## 2017-03-17 PROCEDURE — 37000008 ZZH ANESTHESIA TECHNICAL FEE, 1ST 30 MIN

## 2017-03-17 PROCEDURE — 25800025 ZZH RX 258: Performed by: ANESTHESIOLOGY

## 2017-03-17 PROCEDURE — 25000128 H RX IP 250 OP 636: Performed by: NURSE ANESTHETIST, CERTIFIED REGISTERED

## 2017-03-17 PROCEDURE — 25000125 ZZHC RX 250: Performed by: NURSE ANESTHETIST, CERTIFIED REGISTERED

## 2017-03-17 RX ORDER — ONDANSETRON 2 MG/ML
4 INJECTION INTRAMUSCULAR; INTRAVENOUS EVERY 6 HOURS PRN
Status: DISCONTINUED | OUTPATIENT
Start: 2017-03-17 | End: 2017-03-18 | Stop reason: HOSPADM

## 2017-03-17 RX ORDER — SODIUM CHLORIDE, SODIUM LACTATE, POTASSIUM CHLORIDE, CALCIUM CHLORIDE 600; 310; 30; 20 MG/100ML; MG/100ML; MG/100ML; MG/100ML
500 INJECTION, SOLUTION INTRAVENOUS CONTINUOUS
Status: DISCONTINUED | OUTPATIENT
Start: 2017-03-17 | End: 2017-03-18 | Stop reason: HOSPADM

## 2017-03-17 RX ORDER — KETOROLAC TROMETHAMINE 30 MG/ML
30 INJECTION, SOLUTION INTRAMUSCULAR; INTRAVENOUS EVERY 6 HOURS PRN
Status: DISCONTINUED | OUTPATIENT
Start: 2017-03-17 | End: 2017-03-18 | Stop reason: HOSPADM

## 2017-03-17 RX ORDER — LABETALOL HYDROCHLORIDE 5 MG/ML
INJECTION, SOLUTION INTRAVENOUS PRN
Status: DISCONTINUED | OUTPATIENT
Start: 2017-03-17 | End: 2017-03-17

## 2017-03-17 RX ORDER — KETOROLAC TROMETHAMINE 30 MG/ML
30 INJECTION, SOLUTION INTRAMUSCULAR; INTRAVENOUS EVERY 6 HOURS PRN
Status: CANCELLED
Start: 2017-03-17

## 2017-03-17 RX ORDER — ONDANSETRON 2 MG/ML
4 INJECTION INTRAMUSCULAR; INTRAVENOUS EVERY 6 HOURS PRN
Status: CANCELLED
Start: 2017-03-17

## 2017-03-17 RX ADMIN — SODIUM CHLORIDE, POTASSIUM CHLORIDE, SODIUM LACTATE AND CALCIUM CHLORIDE 500 ML: 600; 310; 30; 20 INJECTION, SOLUTION INTRAVENOUS at 06:11

## 2017-03-17 RX ADMIN — KETOROLAC TROMETHAMINE 30 MG: 30 INJECTION, SOLUTION INTRAMUSCULAR at 06:12

## 2017-03-17 RX ADMIN — SODIUM CHLORIDE, POTASSIUM CHLORIDE, SODIUM LACTATE AND CALCIUM CHLORIDE 500 ML: 600; 310; 30; 20 INJECTION, SOLUTION INTRAVENOUS at 06:48

## 2017-03-17 RX ADMIN — SUCCINYLCHOLINE CHLORIDE 80 MG: 20 INJECTION, SOLUTION INTRAMUSCULAR; INTRAVENOUS at 06:36

## 2017-03-17 RX ADMIN — LABETALOL HYDROCHLORIDE 10 MG: 5 INJECTION, SOLUTION INTRAVENOUS at 06:45

## 2017-03-17 RX ADMIN — ONDANSETRON 4 MG: 2 INJECTION INTRAMUSCULAR; INTRAVENOUS at 06:11

## 2017-03-17 RX ADMIN — METHOHEXITAL SODIUM 100 MG: 500 INJECTION, POWDER, LYOPHILIZED, FOR SOLUTION INTRAMUSCULAR; INTRAVENOUS; RECTAL at 06:35

## 2017-03-17 RX ADMIN — LABETALOL HYDROCHLORIDE 12.5 MG: 5 INJECTION, SOLUTION INTRAVENOUS at 06:35

## 2017-03-17 NOTE — ANESTHESIA POSTPROCEDURE EVALUATION
Patient: Bull Garza    * No procedures listed *    Diagnosis:* No pre-op diagnosis entered *  Diagnosis Additional Information: No value filed.    Anesthesia Type:  General    Note:  Anesthesia Post Evaluation    Patient location during evaluation: PACU  Patient participation: Able to fully participate in evaluation  Level of consciousness: awake and alert  Pain management: adequate  Airway patency: patent  Cardiovascular status: acceptable  Respiratory status: acceptable  Hydration status: acceptable  PONV: none     Anesthetic complications: None          Last vitals:  Vitals:    03/17/17 0710 03/17/17 0715 03/17/17 0720   BP: 123/74 119/68 122/83   Resp: 19 17 18   SpO2: 95% 97% 96%         Electronically Signed By: Erick Comer MD  March 17, 2017  8:39 AM

## 2017-03-17 NOTE — PROGRESS NOTES
Clinic Care Coordination Contact  Care Team Conversations      Clinical Data: Clinic Care Coordinator, ANGELICA's call to patient stating following up from hospitalization as he was last seen at [facility] in 05/2017  Clinic Care CoordinatorANGELICA asked patient where patient is being seen for primary care  Patient stated he has PCP at [facility].  Clinic Care CoordinatorANGELICA appo;ogized for call & no further info needed      Plan:  No further follow up will be provided as patient is not being seen at [facility] at this time    ARELIS Nova, [facility]  Clinic Care Coordinator, ANGELICA with [facility]  [phone]

## 2017-03-17 NOTE — ANESTHESIA CARE TRANSFER NOTE
Patient: Bull Garza    * No procedures listed *    Diagnosis: * No pre-op diagnosis entered *  Diagnosis Additional Information: No value filed.    Anesthesia Type:   General     Note:  Airway :Nasal Cannula  Patient transferred to:PACU  Comments: Patient spontaneously breathing. Labetolol given for high SBP. Report given to RN      Vitals: (Last set prior to Anesthesia Care Transfer)    CRNA VITALS  3/17/2017 0616 - 3/17/2017 0646      3/17/2017             Pulse: 85    SpO2: 97 %    Resp Rate (set): 10                Electronically Signed By: TRISHA Kerns CRNA  March 17, 2017  6:46 AM

## 2017-03-17 NOTE — PROCEDURES
[facility] ECT Procedure Note     Bull Garza [id number]   56 year old 1960     Patient Status: outpatient    Allergies   Allergen Reactions     Nka [No Known Allergies]        Weight:  0 lbs 0 oz              Diagnosis:   Major depression       Indications for ECT:   Medications ineffective       Pause for the Cause:     Right patient Yes   Right procedure/laterality settings: Yes   Right diagnosis Yes          Intra-Procedure Documentation:     Date:  3/17/2017  Time:  6:31 AM    ECT #    Treatment number this series: 3   Total treatment number: 9   Type of ECT:  Bilateral, standard    ECT Medications administered: Brevital: 120mg  Succinyl Choline: 100mg         Clinical Narrative:     ECT was administered by Thymatron machine.  Pt has been medically cleared for procedure, consent signed. Side effects, Risks and benefits reviewed.    ECT Strip Summary:   Energy Level: 65 percent  Motor Seizure Duration: 30 seconds  EEG Seizure Duration: 30 seconds    Complications: No    Plan: 4th ECT 3/20/17  Outpatient Psychiatrist: Dr Hill

## 2017-03-17 NOTE — ANESTHESIA PREPROCEDURE EVALUATION
Anesthesia Evaluation     . Pt has had prior anesthetic.     No history of anesthetic complications     ROS/MED HX    ENT/Pulmonary:      (-) sleep apnea   Neurologic:       Cardiovascular:     (+) hypertension----. : . . . :. .       METS/Exercise Tolerance:     Hematologic:         Musculoskeletal:         GI/Hepatic:        (-) GERD and liver disease   Renal/Genitourinary:      (-) renal disease   Endo:      (-) Type I DM and Type II DM   Psychiatric:     (+) psychiatric history depression      Infectious Disease:         Malignancy:         Other:               Physical Exam  Normal systems: cardiovascular, pulmonary and dental    Airway   Mallampati: I  TM distance: >3 FB  Neck ROM: full    Dental     Cardiovascular       Pulmonary                     Anesthesia Plan      History & Physical Review  History and physical reviewed and following examination; no interval change.    ASA Status:  2 .    NPO Status:  > 8 hours    Plan for General with Intravenous induction.   PONV prophylaxis:  Ondansetron (or other 5HT-3)       Postoperative Care      Consents  Anesthetic plan, risks, benefits and alternatives discussed with:  Patient..                          .

## 2017-03-17 NOTE — TELEPHONE ENCOUNTER
See care coordination encounter for 03/17/2017  Please note patient is no longer being seen at  clinic  Patient is seen at [facility] in [place]  Merline ARELIS Tracy, [facility]  Clinic Care Coordinator,  with [facility]  [phone]

## 2017-03-20 ENCOUNTER — ANESTHESIA (OUTPATIENT)
Dept: SURGERY | Facility: CLINIC | Age: 57
End: 2017-03-20

## 2017-03-20 ENCOUNTER — ANESTHESIA EVENT (OUTPATIENT)
Dept: SURGERY | Facility: CLINIC | Age: 57
End: 2017-03-20

## 2017-03-20 ENCOUNTER — HOSPITAL ENCOUNTER (OUTPATIENT)
Dept: SURGERY | Facility: CLINIC | Age: 57
Discharge: HOME | End: 2017-03-20
Attending: PSYCHIATRY & NEUROLOGY | Admitting: PSYCHIATRY & NEUROLOGY
Payer: COMMERCIAL

## 2017-03-20 VITALS
RESPIRATION RATE: 19 BRPM | TEMPERATURE: 97.3 F | DIASTOLIC BLOOD PRESSURE: 68 MMHG | OXYGEN SATURATION: 94 % | SYSTOLIC BLOOD PRESSURE: 123 MMHG | HEART RATE: 53 BPM

## 2017-03-20 DIAGNOSIS — F33.2 SEVERE RECURRENT MAJOR DEPRESSION WITHOUT PSYCHOTIC FEATURES (H): ICD-10-CM

## 2017-03-20 PROCEDURE — 37000008 ZZH ANESTHESIA TECHNICAL FEE, 1ST 30 MIN

## 2017-03-20 PROCEDURE — 90870 ELECTROCONVULSIVE THERAPY: CPT

## 2017-03-20 PROCEDURE — 25000125 ZZHC RX 250: Performed by: NURSE ANESTHETIST, CERTIFIED REGISTERED

## 2017-03-20 PROCEDURE — 25000125 ZZHC RX 250: Performed by: ANESTHESIOLOGY

## 2017-03-20 PROCEDURE — 25000128 H RX IP 250 OP 636: Performed by: PSYCHIATRY & NEUROLOGY

## 2017-03-20 PROCEDURE — 40000010 ZZH STATISTIC ANES STAT CODE-CRNA PER MINUTE

## 2017-03-20 PROCEDURE — 25800025 ZZH RX 258: Performed by: ANESTHESIOLOGY

## 2017-03-20 PROCEDURE — 25000128 H RX IP 250 OP 636: Performed by: NURSE ANESTHETIST, CERTIFIED REGISTERED

## 2017-03-20 RX ORDER — SODIUM CHLORIDE, SODIUM LACTATE, POTASSIUM CHLORIDE, CALCIUM CHLORIDE 600; 310; 30; 20 MG/100ML; MG/100ML; MG/100ML; MG/100ML
INJECTION, SOLUTION INTRAVENOUS CONTINUOUS
Status: DISCONTINUED | OUTPATIENT
Start: 2017-03-20 | End: 2017-03-21 | Stop reason: HOSPADM

## 2017-03-20 RX ORDER — ONDANSETRON 2 MG/ML
4 INJECTION INTRAMUSCULAR; INTRAVENOUS EVERY 30 MIN PRN
Status: DISCONTINUED | OUTPATIENT
Start: 2017-03-20 | End: 2017-03-21 | Stop reason: HOSPADM

## 2017-03-20 RX ORDER — KETOROLAC TROMETHAMINE 30 MG/ML
30 INJECTION, SOLUTION INTRAMUSCULAR; INTRAVENOUS EVERY 6 HOURS PRN
Status: DISCONTINUED | OUTPATIENT
Start: 2017-03-20 | End: 2017-03-21 | Stop reason: HOSPADM

## 2017-03-20 RX ORDER — LABETALOL HYDROCHLORIDE 5 MG/ML
INJECTION, SOLUTION INTRAVENOUS PRN
Status: DISCONTINUED | OUTPATIENT
Start: 2017-03-20 | End: 2017-03-20

## 2017-03-20 RX ORDER — LABETALOL HYDROCHLORIDE 5 MG/ML
10 INJECTION, SOLUTION INTRAVENOUS
Status: DISCONTINUED | OUTPATIENT
Start: 2017-03-20 | End: 2017-03-21 | Stop reason: HOSPADM

## 2017-03-20 RX ORDER — MEPERIDINE HYDROCHLORIDE 25 MG/ML
12.5 INJECTION INTRAMUSCULAR; INTRAVENOUS; SUBCUTANEOUS
Status: DISCONTINUED | OUTPATIENT
Start: 2017-03-20 | End: 2017-03-21 | Stop reason: HOSPADM

## 2017-03-20 RX ORDER — ONDANSETRON 2 MG/ML
4 INJECTION INTRAMUSCULAR; INTRAVENOUS EVERY 6 HOURS PRN
Status: CANCELLED
Start: 2017-03-20

## 2017-03-20 RX ORDER — NALOXONE HYDROCHLORIDE 0.4 MG/ML
.1-.4 INJECTION, SOLUTION INTRAMUSCULAR; INTRAVENOUS; SUBCUTANEOUS
Status: DISCONTINUED | OUTPATIENT
Start: 2017-03-20 | End: 2017-03-21 | Stop reason: HOSPADM

## 2017-03-20 RX ORDER — ONDANSETRON 4 MG/1
4 TABLET, ORALLY DISINTEGRATING ORAL EVERY 30 MIN PRN
Status: DISCONTINUED | OUTPATIENT
Start: 2017-03-20 | End: 2017-03-21 | Stop reason: HOSPADM

## 2017-03-20 RX ORDER — SODIUM CHLORIDE, SODIUM LACTATE, POTASSIUM CHLORIDE, CALCIUM CHLORIDE 600; 310; 30; 20 MG/100ML; MG/100ML; MG/100ML; MG/100ML
INJECTION, SOLUTION INTRAVENOUS ONCE
Status: COMPLETED | OUTPATIENT
Start: 2017-03-20 | End: 2017-03-20

## 2017-03-20 RX ORDER — ONDANSETRON 2 MG/ML
4 INJECTION INTRAMUSCULAR; INTRAVENOUS EVERY 6 HOURS PRN
Status: DISCONTINUED | OUTPATIENT
Start: 2017-03-20 | End: 2017-03-21 | Stop reason: HOSPADM

## 2017-03-20 RX ORDER — FENTANYL CITRATE 50 UG/ML
25-50 INJECTION, SOLUTION INTRAMUSCULAR; INTRAVENOUS
Status: DISCONTINUED | OUTPATIENT
Start: 2017-03-20 | End: 2017-03-21 | Stop reason: HOSPADM

## 2017-03-20 RX ORDER — KETOROLAC TROMETHAMINE 30 MG/ML
30 INJECTION, SOLUTION INTRAMUSCULAR; INTRAVENOUS EVERY 6 HOURS PRN
Status: CANCELLED
Start: 2017-03-20

## 2017-03-20 RX ADMIN — SUCCINYLCHOLINE CHLORIDE 100 MG: 20 INJECTION, SOLUTION INTRAMUSCULAR; INTRAVENOUS at 06:31

## 2017-03-20 RX ADMIN — METHOHEXITAL SODIUM 100 MG: 500 INJECTION, POWDER, LYOPHILIZED, FOR SOLUTION INTRAMUSCULAR; INTRAVENOUS; RECTAL at 06:31

## 2017-03-20 RX ADMIN — LIDOCAINE HYDROCHLORIDE 1 MG: 10 INJECTION, SOLUTION EPIDURAL; INFILTRATION; INTRACAUDAL; PERINEURAL at 06:27

## 2017-03-20 RX ADMIN — SODIUM CHLORIDE, POTASSIUM CHLORIDE, SODIUM LACTATE AND CALCIUM CHLORIDE: 600; 310; 30; 20 INJECTION, SOLUTION INTRAVENOUS at 06:27

## 2017-03-20 RX ADMIN — LABETALOL HYDROCHLORIDE 10 MG: 5 INJECTION, SOLUTION INTRAVENOUS at 06:35

## 2017-03-20 RX ADMIN — ONDANSETRON 4 MG: 2 SOLUTION INTRAMUSCULAR; INTRAVENOUS at 06:24

## 2017-03-20 RX ADMIN — KETOROLAC TROMETHAMINE 30 MG: 30 INJECTION, SOLUTION INTRAMUSCULAR at 06:24

## 2017-03-20 RX ADMIN — LABETALOL HYDROCHLORIDE 10 MG: 5 INJECTION, SOLUTION INTRAVENOUS at 06:42

## 2017-03-20 NOTE — ANESTHESIA POSTPROCEDURE EVALUATION
Patient: Bull Garza    * No procedures listed *    Diagnosis:* No pre-op diagnosis entered *  Diagnosis Additional Information: No value filed.    Anesthesia Type:  General    Note:  Anesthesia Post Evaluation    Patient location during evaluation: PACU  Patient participation: Able to fully participate in evaluation  Level of consciousness: sleepy but conscious and responsive to verbal stimuli  Pain management: adequate  Airway patency: patent  Cardiovascular status: acceptable and hemodynamically stable  Respiratory status: acceptable and unassisted  Hydration status: acceptable  PONV: none     Anesthetic complications: None          Last vitals:  Vitals:    03/20/17 0705 03/20/17 0710 03/20/17 0715   BP: 127/64 148/77 123/68   Pulse:      Resp: 18 16 19   Temp:      SpO2: 94% 94% 94%         Electronically Signed By: Ko Gallardo MD  March 20, 2017  7:16 AM

## 2017-03-20 NOTE — PROGRESS NOTES
0710hr - DC & follow-up teaching completed with pts spouse.  0716hr - Pt transferred to family car in w/chair by CNA. Pt denied any needs/concerns at time of dc home.

## 2017-03-20 NOTE — PROCEDURES
[facility] ECT Procedure Note     Bull Garza [id number]   56 year old 1960     Patient Status: outpatient    Allergies   Allergen Reactions     Nka [No Known Allergies]        Weight:  0 lbs 0 oz              Diagnosis:   Major depression       Indications for ECT:   Medications ineffective       Pause for the Cause:     Right patient Yes   Right procedure/laterality settings: Yes   Right diagnosis Yes          Intra-Procedure Documentation:     Date:  3/20/2017  Time:  6:31 AM    ECT #    Treatment number this series: 4   Total treatment number: 10   Type of ECT:  Bilateral, standard    ECT Medications administered: Brevital: 120mg  Succinyl Choline: 100mg         Clinical Narrative:     ECT was administered by Thymatron machine.  Pt has been medically cleared for procedure, consent signed. Side effects, Risks and benefits reviewed.    ECT Strip Summary:   Energy Level: 70 percent  Motor Seizure Duration: 30 seconds  EEG Seizure Duration: 30 seconds    Complications: No    Plan: 5th ECT 3/22/17  Outpatient Psychiatrist: Dr Hill

## 2017-03-22 ENCOUNTER — ANESTHESIA EVENT (OUTPATIENT)
Dept: SURGERY | Facility: CLINIC | Age: 57
End: 2017-03-22

## 2017-03-22 ENCOUNTER — HOSPITAL ENCOUNTER (OUTPATIENT)
Dept: SURGERY | Facility: CLINIC | Age: 57
Discharge: HOME | End: 2017-03-22
Attending: PSYCHIATRY & NEUROLOGY | Admitting: PSYCHIATRY & NEUROLOGY
Payer: COMMERCIAL

## 2017-03-22 ENCOUNTER — ANESTHESIA (OUTPATIENT)
Dept: SURGERY | Facility: CLINIC | Age: 57
End: 2017-03-22

## 2017-03-22 VITALS
RESPIRATION RATE: 16 BRPM | DIASTOLIC BLOOD PRESSURE: 88 MMHG | OXYGEN SATURATION: 95 % | SYSTOLIC BLOOD PRESSURE: 127 MMHG | TEMPERATURE: 98.2 F

## 2017-03-22 DIAGNOSIS — F33.2 SEVERE RECURRENT MAJOR DEPRESSION WITHOUT PSYCHOTIC FEATURES (H): ICD-10-CM

## 2017-03-22 PROCEDURE — 25000125 ZZHC RX 250: Performed by: NURSE ANESTHETIST, CERTIFIED REGISTERED

## 2017-03-22 PROCEDURE — 25000128 H RX IP 250 OP 636: Performed by: PSYCHIATRY & NEUROLOGY

## 2017-03-22 PROCEDURE — 25000128 H RX IP 250 OP 636: Performed by: SURGERY

## 2017-03-22 PROCEDURE — 90870 ELECTROCONVULSIVE THERAPY: CPT

## 2017-03-22 PROCEDURE — 40000010 ZZH STATISTIC ANES STAT CODE-CRNA PER MINUTE

## 2017-03-22 PROCEDURE — 25800025 ZZH RX 258: Performed by: SURGERY

## 2017-03-22 PROCEDURE — 37000008 ZZH ANESTHESIA TECHNICAL FEE, 1ST 30 MIN

## 2017-03-22 RX ORDER — SODIUM CHLORIDE, SODIUM LACTATE, POTASSIUM CHLORIDE, CALCIUM CHLORIDE 600; 310; 30; 20 MG/100ML; MG/100ML; MG/100ML; MG/100ML
INJECTION, SOLUTION INTRAVENOUS CONTINUOUS
Status: DISCONTINUED | OUTPATIENT
Start: 2017-03-22 | End: 2017-03-23 | Stop reason: HOSPADM

## 2017-03-22 RX ORDER — ONDANSETRON 2 MG/ML
4 INJECTION INTRAMUSCULAR; INTRAVENOUS EVERY 30 MIN PRN
Status: DISCONTINUED | OUTPATIENT
Start: 2017-03-22 | End: 2017-03-23 | Stop reason: HOSPADM

## 2017-03-22 RX ORDER — LABETALOL HYDROCHLORIDE 5 MG/ML
10 INJECTION, SOLUTION INTRAVENOUS
Status: COMPLETED | OUTPATIENT
Start: 2017-03-22 | End: 2017-03-22

## 2017-03-22 RX ORDER — ONDANSETRON 4 MG/1
4 TABLET, ORALLY DISINTEGRATING ORAL EVERY 30 MIN PRN
Status: DISCONTINUED | OUTPATIENT
Start: 2017-03-22 | End: 2017-03-23 | Stop reason: HOSPADM

## 2017-03-22 RX ORDER — NALOXONE HYDROCHLORIDE 0.4 MG/ML
.1-.4 INJECTION, SOLUTION INTRAMUSCULAR; INTRAVENOUS; SUBCUTANEOUS
Status: DISCONTINUED | OUTPATIENT
Start: 2017-03-22 | End: 2017-03-23 | Stop reason: HOSPADM

## 2017-03-22 RX ORDER — MEPERIDINE HYDROCHLORIDE 25 MG/ML
12.5 INJECTION INTRAMUSCULAR; INTRAVENOUS; SUBCUTANEOUS
Status: DISCONTINUED | OUTPATIENT
Start: 2017-03-22 | End: 2017-03-23 | Stop reason: HOSPADM

## 2017-03-22 RX ORDER — SODIUM CHLORIDE, SODIUM LACTATE, POTASSIUM CHLORIDE, CALCIUM CHLORIDE 600; 310; 30; 20 MG/100ML; MG/100ML; MG/100ML; MG/100ML
INJECTION, SOLUTION INTRAVENOUS ONCE
Status: COMPLETED | OUTPATIENT
Start: 2017-03-22 | End: 2017-03-22

## 2017-03-22 RX ORDER — KETOROLAC TROMETHAMINE 30 MG/ML
30 INJECTION, SOLUTION INTRAMUSCULAR; INTRAVENOUS EVERY 6 HOURS PRN
Status: DISCONTINUED | OUTPATIENT
Start: 2017-03-22 | End: 2017-03-23 | Stop reason: HOSPADM

## 2017-03-22 RX ORDER — KETOROLAC TROMETHAMINE 30 MG/ML
30 INJECTION, SOLUTION INTRAMUSCULAR; INTRAVENOUS EVERY 6 HOURS PRN
Status: CANCELLED
Start: 2017-03-22

## 2017-03-22 RX ORDER — ONDANSETRON 2 MG/ML
4 INJECTION INTRAMUSCULAR; INTRAVENOUS EVERY 6 HOURS PRN
Status: DISCONTINUED | OUTPATIENT
Start: 2017-03-22 | End: 2017-03-23 | Stop reason: HOSPADM

## 2017-03-22 RX ORDER — ONDANSETRON 2 MG/ML
4 INJECTION INTRAMUSCULAR; INTRAVENOUS EVERY 6 HOURS PRN
Status: CANCELLED
Start: 2017-03-22

## 2017-03-22 RX ADMIN — SODIUM CHLORIDE, POTASSIUM CHLORIDE, SODIUM LACTATE AND CALCIUM CHLORIDE: 600; 310; 30; 20 INJECTION, SOLUTION INTRAVENOUS at 06:37

## 2017-03-22 RX ADMIN — LABETALOL HYDROCHLORIDE 10 MG: 5 INJECTION, SOLUTION INTRAVENOUS at 06:37

## 2017-03-22 RX ADMIN — LABETALOL HYDROCHLORIDE 20 MG: 5 INJECTION, SOLUTION INTRAVENOUS at 06:49

## 2017-03-22 RX ADMIN — LABETALOL HYDROCHLORIDE 10 MG: 5 INJECTION, SOLUTION INTRAVENOUS at 06:40

## 2017-03-22 RX ADMIN — ONDANSETRON HYDROCHLORIDE 4 MG: 2 SOLUTION INTRAMUSCULAR; INTRAVENOUS at 06:23

## 2017-03-22 RX ADMIN — SUCCINYLCHOLINE CHLORIDE 100 MG: 20 INJECTION, SOLUTION INTRAMUSCULAR; INTRAVENOUS at 06:38

## 2017-03-22 RX ADMIN — KETOROLAC TROMETHAMINE 30 MG: 30 INJECTION, SOLUTION INTRAMUSCULAR at 06:23

## 2017-03-22 RX ADMIN — SODIUM CHLORIDE, POTASSIUM CHLORIDE, SODIUM LACTATE AND CALCIUM CHLORIDE: 600; 310; 30; 20 INJECTION, SOLUTION INTRAVENOUS at 06:25

## 2017-03-22 RX ADMIN — METHOHEXITAL SODIUM 100 MG: 500 INJECTION, POWDER, LYOPHILIZED, FOR SOLUTION INTRAMUSCULAR; INTRAVENOUS; RECTAL at 06:38
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
## 2017-03-22 NOTE — PROCEDURES
[facility] ECT Procedure Note     Bull Garza [id number]   56 year old 1960     Patient Status: outpatient    Allergies   Allergen Reactions     Nka [No Known Allergies]        Weight:  0 lbs 0 oz              Diagnosis:   Major depression       Indications for ECT:   Medications ineffective       Pause for the Cause:     Right patient Yes   Right procedure/laterality settings: Yes   Right diagnosis Yes          Intra-Procedure Documentation:     Date:  3/22/2017  Time:  6:31 AM    ECT #    Treatment number this series: 5   Total treatment number: 11   Type of ECT:  Bilateral, standard    ECT Medications administered: Brevital: 120mg  Succinyl Choline: 100mg         Clinical Narrative:     ECT was administered by Thymatron machine.  Pt has been medically cleared for procedure, consent signed. Side effects, Risks and benefits reviewed.    ECT Strip Summary:   Energy Level: 75 percent  Motor Seizure Duration: 30 seconds  EEG Seizure Duration: 30 seconds    Complications: No    Plan: 6th ECT 3/24/17  Outpatient Psychiatrist: Dr Hill

## 2017-03-22 NOTE — DISCHARGE INSTRUCTIONS
Instructions given written and verbal to wife Daily Garza. Signed instructions verb. Understanding.

## 2017-03-22 NOTE — ANESTHESIA CARE TRANSFER NOTE
Patient: Bull Garza    * No procedures listed *    Diagnosis: * No pre-op diagnosis entered *  Diagnosis Additional Information: No value filed.    Anesthesia Type:   General     Note:  Airway :Nasal Cannula  Patient transferred to:PACU        Vitals: (Last set prior to Anesthesia Care Transfer)    CRNA VITALS  3/22/2017 0622 - 3/22/2017 0653      3/22/2017             Pulse: 77    SpO2: 97 %    Resp Rate (observed): 23    Resp Rate (set): 10                Electronically Signed By: TRISHA Arce CRNA  March 22, 2017  6:53 AM

## 2017-03-22 NOTE — ANESTHESIA POSTPROCEDURE EVALUATION
Patient: Bull Garza    * No procedures listed *    Diagnosis:* No pre-op diagnosis entered *  Diagnosis Additional Information: No value filed.    Anesthesia Type:  General    Note:  Anesthesia Post Evaluation    Patient location during evaluation: PACU  Patient participation: Able to fully participate in evaluation  Level of consciousness: sleepy but conscious and responsive to verbal stimuli  Pain management: adequate  Airway patency: patent  Cardiovascular status: acceptable and hemodynamically stable  Respiratory status: acceptable and unassisted  Hydration status: acceptable  PONV: none     Anesthetic complications: None          Last vitals:  Vitals:    03/22/17 0715 03/22/17 0720 03/22/17 0725   BP: 122/71  127/88   Resp:  16 16   Temp:   36.8  C (98.2  F)   SpO2:            Electronically Signed By: Ko Gallardo MD  March 22, 2017  7:42 AM

## 2017-03-24 ENCOUNTER — HOSPITAL ENCOUNTER (OUTPATIENT)
Dept: SURGERY | Facility: CLINIC | Age: 57
Discharge: HOME | End: 2017-03-24
Attending: PSYCHIATRY & NEUROLOGY | Admitting: PSYCHIATRY & NEUROLOGY
Payer: COMMERCIAL

## 2017-03-24 ENCOUNTER — ANESTHESIA (OUTPATIENT)
Dept: SURGERY | Facility: CLINIC | Age: 57
End: 2017-03-24

## 2017-03-24 ENCOUNTER — ANESTHESIA EVENT (OUTPATIENT)
Dept: SURGERY | Facility: CLINIC | Age: 57
End: 2017-03-24

## 2017-03-24 VITALS
SYSTOLIC BLOOD PRESSURE: 121 MMHG | OXYGEN SATURATION: 94 % | DIASTOLIC BLOOD PRESSURE: 63 MMHG | RESPIRATION RATE: 20 BRPM | TEMPERATURE: 97.1 F

## 2017-03-24 DIAGNOSIS — F33.2 SEVERE RECURRENT MAJOR DEPRESSION WITHOUT PSYCHOTIC FEATURES (H): ICD-10-CM

## 2017-03-24 PROCEDURE — 90870 ELECTROCONVULSIVE THERAPY: CPT

## 2017-03-24 PROCEDURE — 40000010 ZZH STATISTIC ANES STAT CODE-CRNA PER MINUTE

## 2017-03-24 PROCEDURE — 37000008 ZZH ANESTHESIA TECHNICAL FEE, 1ST 30 MIN

## 2017-03-24 PROCEDURE — 25800025 ZZH RX 258: Performed by: NURSE ANESTHETIST, CERTIFIED REGISTERED

## 2017-03-24 PROCEDURE — 25000128 H RX IP 250 OP 636: Performed by: PSYCHIATRY & NEUROLOGY

## 2017-03-24 PROCEDURE — 25000125 ZZHC RX 250: Performed by: NURSE ANESTHETIST, CERTIFIED REGISTERED

## 2017-03-24 RX ORDER — ONDANSETRON 2 MG/ML
4 INJECTION INTRAMUSCULAR; INTRAVENOUS EVERY 6 HOURS PRN
Status: CANCELLED
Start: 2017-03-24

## 2017-03-24 RX ORDER — SODIUM CHLORIDE, SODIUM LACTATE, POTASSIUM CHLORIDE, CALCIUM CHLORIDE 600; 310; 30; 20 MG/100ML; MG/100ML; MG/100ML; MG/100ML
INJECTION, SOLUTION INTRAVENOUS CONTINUOUS PRN
Status: DISCONTINUED | OUTPATIENT
Start: 2017-03-24 | End: 2017-03-24

## 2017-03-24 RX ORDER — KETOROLAC TROMETHAMINE 30 MG/ML
30 INJECTION, SOLUTION INTRAMUSCULAR; INTRAVENOUS EVERY 6 HOURS PRN
Status: DISCONTINUED | OUTPATIENT
Start: 2017-03-24 | End: 2017-03-25 | Stop reason: HOSPADM

## 2017-03-24 RX ORDER — KETOROLAC TROMETHAMINE 30 MG/ML
30 INJECTION, SOLUTION INTRAMUSCULAR; INTRAVENOUS EVERY 6 HOURS PRN
Status: CANCELLED
Start: 2017-03-24

## 2017-03-24 RX ORDER — ONDANSETRON 2 MG/ML
4 INJECTION INTRAMUSCULAR; INTRAVENOUS EVERY 6 HOURS PRN
Status: DISCONTINUED | OUTPATIENT
Start: 2017-03-24 | End: 2017-03-25 | Stop reason: HOSPADM

## 2017-03-24 RX ADMIN — SODIUM CHLORIDE, POTASSIUM CHLORIDE, SODIUM LACTATE AND CALCIUM CHLORIDE: 600; 310; 30; 20 INJECTION, SOLUTION INTRAVENOUS at 06:18

## 2017-03-24 RX ADMIN — METHOHEXITAL SODIUM 100 MG: 500 INJECTION, POWDER, LYOPHILIZED, FOR SOLUTION INTRAMUSCULAR; INTRAVENOUS; RECTAL at 06:39

## 2017-03-24 RX ADMIN — SUCCINYLCHOLINE CHLORIDE 100 MG: 20 INJECTION, SOLUTION INTRAMUSCULAR; INTRAVENOUS at 06:39

## 2017-03-24 RX ADMIN — ONDANSETRON 4 MG: 2 SOLUTION INTRAMUSCULAR; INTRAVENOUS at 06:18

## 2017-03-24 RX ADMIN — KETOROLAC TROMETHAMINE 30 MG: 30 INJECTION, SOLUTION INTRAMUSCULAR at 06:19

## 2017-03-24 NOTE — ANESTHESIA CARE TRANSFER NOTE
Patient: Bull Garza    * No procedures listed *    Diagnosis: * No pre-op diagnosis entered *  Diagnosis Additional Information: No value filed.    Anesthesia Type:   General, Other     Note:  Airway :Nasal Cannula  Patient transferred to:PACU  Comments: Patient to PACU on NC, breathing spontaneously. Monitors applied, VSS, report to RN. Patient resting comfortably in bed.       Vitals: (Last set prior to Anesthesia Care Transfer)    CRNA VITALS  3/24/2017 0623 - 3/24/2017 0656      3/24/2017             Pulse: 76    SpO2: 94 %    Resp Rate (observed): 19    Resp Rate (set): 10                Electronically Signed By: TRISHA Javier CRNA  March 24, 2017  6:56 AM

## 2017-03-24 NOTE — PROCEDURES
[facility] ECT Procedure Note     Bull Garza [id number]   56 year old 1960     Patient Status: outpatient    Allergies   Allergen Reactions     Nka [No Known Allergies]        Weight:  0 lbs 0 oz              Diagnosis:   Major depression       Indications for ECT:   Medications ineffective       Pause for the Cause:     Right patient Yes   Right procedure/laterality settings: Yes   Right diagnosis Yes          Intra-Procedure Documentation:     Date:  3/24/2017  Time:  6:31 AM    ECT #    Treatment number this series: 6   Total treatment number: 12   Type of ECT:  Bilateral, standard    ECT Medications administered: Brevital: 120mg  Succinyl Choline: 100mg         Clinical Narrative:     ECT was administered by Thymatron machine.  Pt has been medically cleared for procedure, consent signed. Side effects, Risks and benefits reviewed.    ECT Strip Summary:   Energy Level: 100 percent  Motor Seizure Duration: 30 seconds  EEG Seizure Duration: 30 seconds    Complications: No    Plan: done  Outpatient Psychiatrist: Dr Hill

## 2017-03-24 NOTE — ANESTHESIA PREPROCEDURE EVALUATION
Anesthesia Evaluation     . Pt has had prior anesthetic.     No history of anesthetic complications          ROS/MED HX    ENT/Pulmonary:      (-) sleep apnea   Neurologic:       Cardiovascular:     (+) hypertension----. : . . . :. .       METS/Exercise Tolerance:     Hematologic:         Musculoskeletal:         GI/Hepatic:        (-) GERD and liver disease   Renal/Genitourinary:      (-) renal disease   Endo:      (-) Type I DM and Type II DM   Psychiatric:     (+) psychiatric history depression      Infectious Disease:         Malignancy:         Other:                     Physical Exam  Normal systems: cardiovascular, pulmonary and dental    Airway   Mallampati: I  TM distance: >3 FB  Neck ROM: full    Dental     Cardiovascular       Pulmonary                         Anesthesia Plan      History & Physical Review  History and physical reviewed and following examination; no interval change.    ASA Status:  2 .    NPO Status:  > 8 hours    Plan for General and Other with Intravenous induction.   PONV prophylaxis:  Ondansetron (or other 5HT-3)       Postoperative Care      Consents  Anesthetic plan, risks, benefits and alternatives discussed with:  Patient..                          .

## 2017-03-24 NOTE — ANESTHESIA POSTPROCEDURE EVALUATION
Patient: Bull Garza    * No procedures listed *    Diagnosis:* No pre-op diagnosis entered *  Diagnosis Additional Information: No value filed.    Anesthesia Type:  General, Other    Note:  Anesthesia Post Evaluation    Patient location during evaluation: PACU  Patient participation: Able to fully participate in evaluation  Level of consciousness: awake and alert  Pain management: adequate  Airway patency: patent  Cardiovascular status: acceptable  Respiratory status: acceptable  Hydration status: acceptable  PONV: none     Anesthetic complications: None          Last vitals:  Vitals:    03/24/17 0716 03/24/17 0720 03/24/17 0729   BP: 130/74 125/73 121/63   Resp:      Temp:      SpO2:            Electronically Signed By: David Cuello MD  March 24, 2017  7:57 AM
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
## 2019-06-27 ENCOUNTER — HOSPITAL ENCOUNTER (INPATIENT)
Facility: CLINIC | Age: 59
LOS: 6 days | Discharge: HOME | End: 2019-07-03
Attending: PSYCHIATRY & NEUROLOGY | Admitting: PSYCHIATRY & NEUROLOGY
Payer: COMMERCIAL

## 2019-06-27 DIAGNOSIS — F33.2 SEVERE RECURRENT MAJOR DEPRESSION WITHOUT PSYCHOTIC FEATURES (H): Primary | ICD-10-CM

## 2019-06-27 PROBLEM — F32.9 MAJOR DEPRESSION: Status: ACTIVE | Noted: 2019-06-27

## 2019-06-27 LAB
ANION GAP SERPL CALCULATED.3IONS-SCNC: 3 MMOL/L (ref 3–14)
BUN SERPL-MCNC: 23 MG/DL (ref 7–30)
CALCIUM SERPL-MCNC: 9 MG/DL (ref 8.5–10.1)
CHLORIDE SERPL-SCNC: 108 MMOL/L (ref 94–109)
CO2 SERPL-SCNC: 30 MMOL/L (ref 20–32)
CREAT SERPL-MCNC: 0.88 MG/DL (ref 0.66–1.25)
ERYTHROCYTE [DISTWIDTH] IN BLOOD BY AUTOMATED COUNT: 13.8 % (ref 10–15)
GFR SERPL CREATININE-BSD FRML MDRD: >90 ML/MIN/{1.73_M2}
GLUCOSE SERPL-MCNC: 142 MG/DL (ref 70–99)
HBA1C MFR BLD: 5.7 % (ref 0–5.6)
HCT VFR BLD AUTO: 38.4 % (ref 40–53)
HGB BLD-MCNC: 13.4 G/DL (ref 13.3–17.7)
MCH RBC QN AUTO: 29.5 PG (ref 26.5–33)
MCHC RBC AUTO-ENTMCNC: 34.9 G/DL (ref 31.5–36.5)
MCV RBC AUTO: 84 FL (ref 78–100)
PLATELET # BLD AUTO: 211 10E9/L (ref 150–450)
POTASSIUM SERPL-SCNC: 4.1 MMOL/L (ref 3.4–5.3)
RBC # BLD AUTO: 4.55 10E12/L (ref 4.4–5.9)
SODIUM SERPL-SCNC: 141 MMOL/L (ref 133–144)
WBC # BLD AUTO: 6.7 10E9/L (ref 4–11)

## 2019-06-27 PROCEDURE — 83036 HEMOGLOBIN GLYCOSYLATED A1C: CPT | Performed by: PSYCHIATRY & NEUROLOGY

## 2019-06-27 PROCEDURE — 85027 COMPLETE CBC AUTOMATED: CPT | Performed by: PSYCHIATRY & NEUROLOGY

## 2019-06-27 PROCEDURE — 80048 BASIC METABOLIC PNL TOTAL CA: CPT | Performed by: PSYCHIATRY & NEUROLOGY

## 2019-06-27 PROCEDURE — 25000132 ZZH RX MED GY IP 250 OP 250 PS 637: Performed by: PSYCHIATRY & NEUROLOGY

## 2019-06-27 PROCEDURE — 99222 1ST HOSP IP/OBS MODERATE 55: CPT | Mod: AI | Performed by: PHYSICIAN ASSISTANT

## 2019-06-27 PROCEDURE — 36415 COLL VENOUS BLD VENIPUNCTURE: CPT | Performed by: PSYCHIATRY & NEUROLOGY

## 2019-06-27 PROCEDURE — 12400000 ZZH R&B MH

## 2019-06-27 RX ORDER — ESZOPICLONE 3 MG/1
3 TABLET, FILM COATED ORAL AT BEDTIME
Status: DISCONTINUED | OUTPATIENT
Start: 2019-06-27 | End: 2019-07-03 | Stop reason: HOSPADM

## 2019-06-27 RX ORDER — LOSARTAN POTASSIUM AND HYDROCHLOROTHIAZIDE 12.5; 5 MG/1; MG/1
1 TABLET ORAL DAILY
Status: DISCONTINUED | OUTPATIENT
Start: 2019-06-28 | End: 2019-06-27 | Stop reason: RX

## 2019-06-27 RX ORDER — HYDROCHLOROTHIAZIDE 12.5 MG/1
12.5 CAPSULE ORAL DAILY
Status: DISCONTINUED | OUTPATIENT
Start: 2019-06-28 | End: 2019-07-03 | Stop reason: HOSPADM

## 2019-06-27 RX ORDER — IBUPROFEN 200 MG
800 TABLET ORAL EVERY MORNING
COMMUNITY

## 2019-06-27 RX ORDER — ESZOPICLONE 3 MG/1
3 TABLET, FILM COATED ORAL AT BEDTIME
Status: ON HOLD | COMMUNITY
End: 2020-11-27

## 2019-06-27 RX ORDER — LOSARTAN POTASSIUM AND HYDROCHLOROTHIAZIDE 12.5; 5 MG/1; MG/1
1 TABLET ORAL DAILY
Status: ON HOLD | COMMUNITY
End: 2020-11-27

## 2019-06-27 RX ORDER — OXYMETAZOLINE HYDROCHLORIDE 0.05 G/100ML
2 SPRAY NASAL AT BEDTIME
Status: DISCONTINUED | OUTPATIENT
Start: 2019-06-27 | End: 2019-07-03 | Stop reason: HOSPADM

## 2019-06-27 RX ORDER — LOSARTAN POTASSIUM 50 MG/1
50 TABLET ORAL DAILY
Status: DISCONTINUED | OUTPATIENT
Start: 2019-06-28 | End: 2019-07-03 | Stop reason: HOSPADM

## 2019-06-27 RX ORDER — OXYMETAZOLINE HYDROCHLORIDE 0.05 G/100ML
2 SPRAY NASAL AT BEDTIME
COMMUNITY

## 2019-06-27 RX ORDER — IBUPROFEN 400 MG/1
800 TABLET, FILM COATED ORAL EVERY MORNING
Status: DISCONTINUED | OUTPATIENT
Start: 2019-06-28 | End: 2019-07-01 | Stop reason: DRUGHIGH

## 2019-06-27 RX ORDER — FENOFIBRATE 160 MG/1
160 TABLET ORAL
Status: DISCONTINUED | OUTPATIENT
Start: 2019-06-28 | End: 2019-07-03 | Stop reason: HOSPADM

## 2019-06-27 RX ORDER — CHOLECALCIFEROL (VITAMIN D3) 50 MCG
1 TABLET ORAL DAILY
COMMUNITY

## 2019-06-27 RX ORDER — CHOLECALCIFEROL (VITAMIN D3) 50 MCG
2000 TABLET ORAL DAILY
Status: DISCONTINUED | OUTPATIENT
Start: 2019-06-28 | End: 2019-07-03 | Stop reason: HOSPADM

## 2019-06-27 RX ORDER — HYDROXYZINE HYDROCHLORIDE 25 MG/1
25 TABLET, FILM COATED ORAL EVERY 4 HOURS PRN
Status: DISCONTINUED | OUTPATIENT
Start: 2019-06-27 | End: 2019-07-03 | Stop reason: HOSPADM

## 2019-06-27 RX ADMIN — ESZOPICLONE 3 MG: 3 TABLET, COATED ORAL at 21:48

## 2019-06-27 RX ADMIN — OXYMETAZOLINE HYDROCHLORIDE 2 SPRAY: 0.05 SPRAY NASAL at 21:49

## 2019-06-27 ASSESSMENT — MIFFLIN-ST. JEOR: SCORE: 1889.46

## 2019-06-27 ASSESSMENT — ACTIVITIES OF DAILY LIVING (ADL)
TOILETING: 0-->INDEPENDENT
FALL_HISTORY_WITHIN_LAST_SIX_MONTHS: NO
BATHING: 0-->INDEPENDENT
DRESS: 0-->INDEPENDENT
TRANSFERRING: 0-->INDEPENDENT
RETIRED_EATING: 0-->INDEPENDENT
COGNITION: 2 - DIFFICULTY WITH ORGANIZING THOUGHTS
RETIRED_COMMUNICATION: 0-->UNDERSTANDS/COMMUNICATES WITHOUT DIFFICULTY
AMBULATION: 0-->INDEPENDENT
SWALLOWING: 0-->SWALLOWS FOODS/LIQUIDS WITHOUT DIFFICULTY
WHICH_OF_THE_ABOVE_FUNCTIONAL_RISKS_HAD_A_RECENT_ONSET_OR_CHANGE?: COGNITION

## 2019-06-28 ENCOUNTER — ANESTHESIA EVENT (OUTPATIENT)
Dept: SURGERY | Facility: CLINIC | Age: 59
End: 2019-06-28

## 2019-06-28 ENCOUNTER — ANESTHESIA (OUTPATIENT)
Dept: SURGERY | Facility: CLINIC | Age: 59
End: 2019-06-28

## 2019-06-28 PROCEDURE — 93005 ELECTROCARDIOGRAM TRACING: CPT

## 2019-06-28 PROCEDURE — 25000125 ZZHC RX 250: Performed by: NURSE ANESTHETIST, CERTIFIED REGISTERED

## 2019-06-28 PROCEDURE — 90870 ELECTROCONVULSIVE THERAPY: CPT

## 2019-06-28 PROCEDURE — 93010 ELECTROCARDIOGRAM REPORT: CPT | Performed by: INTERNAL MEDICINE

## 2019-06-28 PROCEDURE — 12400000 ZZH R&B MH

## 2019-06-28 PROCEDURE — 25000128 H RX IP 250 OP 636: Performed by: NURSE ANESTHETIST, CERTIFIED REGISTERED

## 2019-06-28 PROCEDURE — 25000132 ZZH RX MED GY IP 250 OP 250 PS 637: Performed by: PSYCHIATRY & NEUROLOGY

## 2019-06-28 PROCEDURE — 25800030 ZZH RX IP 258 OP 636: Performed by: ANESTHESIOLOGY

## 2019-06-28 RX ORDER — SODIUM CHLORIDE, SODIUM LACTATE, POTASSIUM CHLORIDE, CALCIUM CHLORIDE 600; 310; 30; 20 MG/100ML; MG/100ML; MG/100ML; MG/100ML
INJECTION, SOLUTION INTRAVENOUS CONTINUOUS
Status: DISCONTINUED | OUTPATIENT
Start: 2019-06-28 | End: 2019-07-01

## 2019-06-28 RX ADMIN — LOSARTAN POTASSIUM 50 MG: 50 TABLET ORAL at 08:50

## 2019-06-28 RX ADMIN — SUCCINYLCHOLINE CHLORIDE 100 MG: 20 INJECTION, SOLUTION INTRAMUSCULAR; INTRAVENOUS; PARENTERAL at 07:52

## 2019-06-28 RX ADMIN — CHOLECALCIFEROL TAB 50 MCG (2000 UNIT) 2000 UNITS: 50 TAB at 08:50

## 2019-06-28 RX ADMIN — IBUPROFEN 800 MG: 400 TABLET ORAL at 08:49

## 2019-06-28 RX ADMIN — METHOHEXITAL SODIUM 100 MG: 500 INJECTION, POWDER, LYOPHILIZED, FOR SOLUTION INTRAMUSCULAR; INTRAVENOUS; RECTAL at 07:52

## 2019-06-28 RX ADMIN — VORTIOXETINE 20 MG: 20 TABLET, FILM COATED ORAL at 08:49

## 2019-06-28 RX ADMIN — HYDROCHLOROTHIAZIDE 12.5 MG: 12.5 CAPSULE ORAL at 08:50

## 2019-06-28 RX ADMIN — FENOFIBRATE 160 MG: 160 TABLET ORAL at 08:50

## 2019-06-28 RX ADMIN — OXYMETAZOLINE HYDROCHLORIDE 2 SPRAY: 0.05 SPRAY NASAL at 21:56

## 2019-06-28 RX ADMIN — ESZOPICLONE 3 MG: 3 TABLET, COATED ORAL at 21:56

## 2019-06-28 RX ADMIN — SODIUM CHLORIDE, POTASSIUM CHLORIDE, SODIUM LACTATE AND CALCIUM CHLORIDE: 600; 310; 30; 20 INJECTION, SOLUTION INTRAVENOUS at 06:40

## 2019-06-28 ASSESSMENT — ACTIVITIES OF DAILY LIVING (ADL)
DRESS: SCRUBS (BEHAVIORAL HEALTH)
HYGIENE/GROOMING: INDEPENDENT
LAUNDRY: WITH SUPERVISION
HYGIENE/GROOMING: INDEPENDENT
DRESS: SCRUBS (BEHAVIORAL HEALTH)
ORAL_HYGIENE: INDEPENDENT
LAUNDRY: WITH SUPERVISION

## 2019-06-28 ASSESSMENT — LIFESTYLE VARIABLES: TOBACCO_USE: 0

## 2019-06-28 ASSESSMENT — ENCOUNTER SYMPTOMS: SEIZURES: 0

## 2019-06-28 NOTE — H&P
Admitted:     06/27/2019      PRIMARY CARE PROVIDER:  Unlisted.      PRIMARY PSYCHIATRIST:  Dr. Hill.      HISTORY OF PRESENT ILLNESS:  Bull Garza is a 59-year-old gentleman with past medical history of hypertension, hyperlipidemia, depression with prior ECT treatment as well as obstructive sleep apnea, uses CPAP, who presented as a direct admission from ambulatory setting due to acute worsening depression with suicidal ideation and plans for further ECT treatment.  The patient reports ongoing difficulties and significant social stressors at home as he has adult child with special needs and has been increasingly difficult to care for at home.  The patient discussed with Dr. Hill his worsening symptoms with suicidal ideation and was recommended to be admitted to inpatient psychiatry for ECT treatment.  The patient notably does have a history of ECT treatment most recently in 2017, which he tolerated well.  He reports that within the last week he has not had any chest pain, shortness breath, difficulty breathing, fever, chills, cough or shortness of breath.      PAST MEDICAL HISTORY:   1.  Hypertension.   2.  Obstructive sleep apnea.    3.  Hyperlipidemia.   4.  Paroxysmal atrial tachycardia in the past, status post ablation.   5.  Iron deficiency anemia in the remote past.   6.  History of TBI with evacuation of subdural hematoma in 2014.      FAMILY HISTORY:  No family history of mental illness or chemical dependency in the family.      PRIOR TO ADMISSION MEDICATIONS:    Prior to Admission medications    Medication Sig Last Dose Taking? Auth Provider   eszopiclone (LUNESTA) 3 MG tablet Take 3 mg by mouth At Bedtime 6/26/2019 at pm Yes Unknown, Entered By History   fenofibrate (TRIGLIDE) 160 MG tablet Take 1 tablet (160 mg) by mouth daily with food. 6/27/2019 at am Yes Harriet Segal MD   ibuprofen (ADVIL/MOTRIN) 200 MG tablet Take 800 mg by mouth every morning 6/27/2019 at am Yes Unknown, Entered By  History   losartan-hydrochlorothiazide (HYZAAR) 50-12.5 MG tablet Take 1 tablet by mouth daily 6/27/2019 at am Yes Unknown, Entered By History   oxymetazoline (AFRIN 12 HOUR) 0.05 % nasal spray Spray 2 sprays into both nostrils At Bedtime 6/26/2019 at pm Yes Unknown, Entered By History   vitamin D3 (CHOLECALCIFEROL) 2000 units (50 mcg) tablet Take 1 tablet by mouth daily 6/27/2019 at am Yes Unknown, Entered By History   vortioxetine (TRINTELLIX/BRINTELLIX) 20 MG tablet Take 1 tablet (20 mg) by mouth daily 6/27/2019 at am Yes Jose Hill MD           ALLERGIES:  NO KNOWN DRUG ALLERGIES.      SOCIAL HISTORY:  The patient currently lives in [place], [place], lives in a home with his wife and adult child with special needs.      REVIEW OF SYSTEMS:  A 10-point review of systems was performed and is otherwise negative.  Please refer to the HPI.      PHYSICAL EXAMINATION:   VITAL SIGNS:  Temperature 98.2, heart rate 47, respiratory 17, blood pressure 133/67, SpO2 100% on room air.   GENERAL:  Well-developed, well-nourished male who appears comfortable.   HEENT:  Head is normocephalic.  EOMs are intact bilaterally.  Nose and mouth are patent.  Mucous membranes are moist.   LUNGS:  Clear to auscultation bilaterally without wheezes or crackles.   CARDIOVASCULAR:  Regular rate and rhythm, normal S1 and S2.   ABDOMEN:  Soft, nontender, nondistended.     EXTREMITIES:  The patient is spontaneously moving bilateral upper and lower extremities.  Gait is normal.  The patient does have bilateral lower extremity pedal edema, nonpitting, evidence of varicose veins.      LABORATORY DATA:   BMP and CBC are overall unremarkable with a random blood glucose value of 142.      ASSESSMENT AND PLAN:  Bull Garza is a 59-year-old male with a past medical history of depression, anxiety, hypertension, hyperlipidemia, obstructive sleep apnea who presented from ambulatory setting due to acute worsening depression with suicidal ideation  and intention to initiate treatment with ECT.  Hospitalist Service was asked to evaluate the patient for medical H and P as well as clear for ECT.   1.  Worsening depression with suicidal ideation:  The patient with a history longstanding depression, had undergone ECT most recently in 2017 with adequate response.  EKG obtained on admission here indicates sinus bradycardia, otherwise normal ECG without prolonged QTc.  CBC as well as BMP overall unremarkable with the exception of a slightly elevated blood glucose value of 142.  At this time, the patient is medically optimized and can proceed with ECT.   2.  Hypertension:  Continue prior to admission losartan.   3.  Hyperlipidemia:  Continue prior to admission fenofibrate.   4.  Elevated blood glucose value:  Hemoglobin A1c 5.7%, prediabetes. Management per PCP.  5.  Deep venous thrombosis prophylaxis:  Ambulation.      The patient is full code.      We, the Hospitalist Service, thank you for this consultation.  We will sign off at this time.      This patient was staffed with Dr. Madan Camarillo who independently interviewed and evaluated the patient and is in agreement with the above-mentioned plan.         MADAN CAMARILLO DO       As dictated by MENDY MAZARIEGOS PA-C            D: 2019   T: 2019   MT:       Name:     JOSSELIN GARCIA   MRN:      [id number]        Account:      [id number]   :      1960        Admitted:     2019                   Document: [id number]
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
## 2019-06-28 NOTE — ANESTHESIA CARE TRANSFER NOTE
Patient: Bull Garza    * No procedures listed *    Diagnosis: * No pre-op diagnosis entered *  Diagnosis Additional Information: No value filed.    Anesthesia Type:   General, RSI     Note:  Airway :Nasal Cannula  Patient transferred to:PACU  Comments: Level of Conscious:Awake  Vital Signs   BP:150/75   HR:46   RR:16   O2 Saturation:100   Oxygen LPM:3  Dentition:Unchanged from preop  Patient Status:Stable  Report to PACU RN.Handoff Report: Identifed the Patient, Identified the Reponsible Provider, Reviewed the pertinent medical history, Discussed the surgical course, Reviewed Intra-OP anesthesia mangement and issues during anesthesia, Set expectations for post-procedure period and Allowed opportunity for questions and acknowledgement of understanding      Vitals: (Last set prior to Anesthesia Care Transfer)    CRNA VITALS  6/28/2019 0728 - 6/28/2019 0758      6/28/2019             Pulse:  46  (Abnormal)     SpO2:  100 %    Resp Rate (set):  10                Electronically Signed By: Christine Marie Volp Hodgkins, CRNA, APRN CRNA  June 28, 2019  7:58 AM

## 2019-06-28 NOTE — PHARMACY-ADMISSION MEDICATION HISTORY
Admission medication history interview status for the 6/27/2019  admission is complete. See EPIC admission navigator for prior to admission medications     Medication history source reliability:Good    Actions taken by pharmacist (provider contacted, etc):Verified medications with [facility]'s retail pharmacy ([facility]). Also verified medications through [facility] records in Epic.      Additional medication history information not noted on PTA med list :None    Medication reconciliation/reorder completed by provider prior to medication history? No    Time spent in this activity: 10 minutes    Prior to Admission medications    Medication Sig Last Dose Taking? Auth Provider   eszopiclone (LUNESTA) 3 MG tablet Take 3 mg by mouth At Bedtime 6/26/2019 at pm Yes Unknown, Entered By History   fenofibrate (TRIGLIDE) 160 MG tablet Take 1 tablet (160 mg) by mouth daily with food. 6/27/2019 at am Yes Harriet Segal MD   ibuprofen (ADVIL/MOTRIN) 200 MG tablet Take 800 mg by mouth every morning 6/27/2019 at am Yes Unknown, Entered By History   losartan-hydrochlorothiazide (HYZAAR) 50-12.5 MG tablet Take 1 tablet by mouth daily 6/27/2019 at am Yes Unknown, Entered By History   oxymetazoline (AFRIN 12 HOUR) 0.05 % nasal spray Spray 2 sprays into both nostrils At Bedtime 6/26/2019 at pm Yes Unknown, Entered By History   vitamin D3 (CHOLECALCIFEROL) 2000 units (50 mcg) tablet Take 1 tablet by mouth daily 6/27/2019 at am Yes Unknown, Entered By History   vortioxetine (TRINTELLIX/BRINTELLIX) 20 MG tablet Take 1 tablet (20 mg) by mouth daily 6/27/2019 at am Yes Jose Hill MD

## 2019-06-28 NOTE — PLAN OF CARE
Welcome packet reviewed with patient. Information reviewed includes getting emergency help, preventing infections, understanding your care, using medication safely, reducing falls, preventing pressure ulcers, smoking cessation, powerful choices and Patients Bill of Rights. Pt. given tour of the unit and instruction on use of facility including emergency call light. Program schedule reviewed with patient. Questions regarding the unit addressed. Pt. Search completed and belongings inventoried.       Nursing assessment complete including patient and medication profiles. Risk assessments completed addressing suicide,fall,skin,nutrition and safety issues. Care plan initiated. Assessments reviewed with physician and admit orders received. Video monitoring in progress, Patient Informed.

## 2019-06-28 NOTE — ANESTHESIA POSTPROCEDURE EVALUATION
Patient: Bull Garza    * No procedures listed *    Diagnosis:* No pre-op diagnosis entered *  Diagnosis Additional Information: No value filed.    Anesthesia Type:  General, RSI    Note:  Anesthesia Post Evaluation    Patient location during evaluation: PACU  Patient participation: Able to fully participate in evaluation  Level of consciousness: awake and alert  Pain management: adequate  Airway patency: patent  Cardiovascular status: acceptable  Respiratory status: acceptable  Hydration status: acceptable  PONV: none     Anesthetic complications: None          Last vitals:  Vitals:    06/28/19 0830 06/28/19 1037 06/28/19 1619   BP: 145/78 123/60 120/64   Pulse: 58 54 56   Resp: 14 16 16   Temp:  36.8  C (98.3  F) 36.8  C (98.3  F)   SpO2: 98% 97% 96%         Electronically Signed By: Melissa Doran MD  June 28, 2019  4:27 PM

## 2019-06-28 NOTE — H&P
[facility] Psychiatric H&P Note       Initial History     The patient's care was discussed with the treatment team and chart notes were reviewed.     Patient examined for psychiatric admission.     IDENTIFICATION  Patient is a 59 year old  male who is a father to three children. Patient sees Dr. Hill at [facility] for outpatient care. Pt sees PCP Dr. Haywood primary care provider on file. Pt seen on 6/28/19 by Dr. Hill.    CHIEF COMPLAINT  Increased depression     HISTORY OF PRESENT ILLNESS  Patient obtains a longstanding and significant history of major depression and borderline personality disorder. He has previous inpatient mental health admissions, last one being in 2017 here at [facility]. Patient has followed Dr. Hill at [facility] as an outpatient for a few years. Patient has been tried on numerous psychiatric medications in the past along with undergoing a series of bilateral ECT sessions. He presents to [facility] this time as a direct admit from Dr. Hill's office. While at [facility], the patient expressed feeling more overwhelmed and stressed. He confirmed high levels of irritability, agitation, and aggression and that he has been more inclined to act on these emotions. The psychiatrist treatment, ECT, was reviewed with patient at that time. Due to patient responding well to this form of therapy in the past, Dr. Hill deemed patient to be appropriate for an inpatient series. Patient was very much in agreement with this plan. He received his first session on 6/28/19 where there were no complications.    CHEMICAL DEPENDENCY HISTORY  Patient does not obtain a history of chemical dependency. He has never undergone chemical dependency treatment.     PAST  PSYCHIATRIC HISTORY  Patient obtains previous inpatient mental health hospitalizations, his last admission was here at [facility] in 2017 where he was under the care of Dr. Hill. He has  followed Dr. Hill at his outpatient clinic, [facility] for a few years. Patient has undergone ECT psychiatric treatment in the past (a series of 6 bilateral) in which he tolerated and benefited from. He has attended the [facility] intensive outpatient program along with DBT therapy. Patient has a history of self harming behaviors such as cutting.     Previous psychiatric medications include: Prozac, Paxil, Zoloft, Celexa, Lexapro, Remeron, Cymbalta, Effexor, Serzone, Wellbutrin, Lunesta, Ambien, Restoril (overdosed on), Trintellix, Viibryd, Seroquel, Abilify, Zyprexa, Risperdal, Trazodone, and Vistaril     FAMILY HISTORY  The patient denies mental illness or chemical dependency history in family. Patient has a daughter with special needs.    SOCIAL HISTORY  Patient grew up in [place], [place] where there were 6 children in the family. He grew up with both parents. His dad was extremely controlling and angry, so growing up was not a good experience for the patient. He graduated high school and went on to attend college at [employer] for 2 years. Patient then worked for [email or web address]; he has worked for [employer] for 25 years as , but left the job with new ownership. He then worked for [employer] for 5 years until he left. Went to work for [employer] however he was let go. He has worked for two other food chains. Patient has struggled in his jobs. He currently resides with his wife and three children.      Medications     Medications Prior to Admission   Medication Sig Dispense Refill Last Dose     eszopiclone (LUNESTA) 3 MG tablet Take 3 mg by mouth At Bedtime   6/26/2019 at pm     fenofibrate (TRIGLIDE) 160 MG tablet Take 1 tablet (160 mg) by mouth daily with food. 90 tablet 1 6/27/2019 at am     ibuprofen (ADVIL/MOTRIN) 200 MG tablet Take 800 mg by mouth every morning   6/27/2019 at am     losartan-hydrochlorothiazide (HYZAAR) 50-12.5 MG tablet Take 1  tablet by mouth daily   6/27/2019 at am     oxymetazoline (AFRIN 12 HOUR) 0.05 % nasal spray Spray 2 sprays into both nostrils At Bedtime   6/26/2019 at pm     vitamin D3 (CHOLECALCIFEROL) 2000 units (50 mcg) tablet Take 1 tablet by mouth daily   6/27/2019 at am     vortioxetine (TRINTELLIX/BRINTELLIX) 20 MG tablet Take 1 tablet (20 mg) by mouth daily 30 tablet 0 6/27/2019 at am       Scheduled Medications:    [Auto Hold] eszopiclone  3 mg Oral At Bedtime     [Auto Hold] fenofibrate  160 mg Oral Daily with breakfast     [Auto Hold] losartan  50 mg Oral Daily    And     [Auto Hold] hydrochlorothiazide  12.5 mg Oral Daily     [Auto Hold] ibuprofen  800 mg Oral QAM     [Auto Hold] oxymetazoline  2 spray Both Nostrils At Bedtime     [Auto Hold] vitamin D3  2,000 Units Oral Daily     [Auto Hold] vortioxetine  20 mg Oral Daily     PRNs:  [Auto Hold] hydrOXYzine      Allergies      Allergies   Allergen Reactions     Nka [No Known Allergies]         Previous Medical History     Past Medical History:   Diagnosis Date     Depression, major 2001     HTN (hypertension)         Medical Review of Systems     /76   Pulse (!) 46   Temp 98.2  F (36.8  C) (Temporal)   Resp 22   Ht 1.829 m (6')   Wt 104.1 kg (229 lb 9.6 oz)   SpO2 97%   BMI 31.14 kg/m    Body mass index is 31.14 kg/m .    Previous 10-point ROS completed by Elvis Villarreal PA-C on 6/28/19 reviewed by Jose Hill MD on June 28, 2019 and is unchanged except for those problems mentioned within the HPI.     Mental Status Examination     Appearance Sitting in chair, dressed in hospital scubs. Appears stated age.   Attitude Cooperative   Orientation Oriented to person, place, time   Eye Contact Fair   Speech Regular rate, rhythm, volume and tone   Language Normal   Psychomotor Behavior Normal   Mood Depressed   Affect Flat and depressed    Thought Process Goal-Oriented, Intact   Associations Intact   Thought Content Patient is currently negative for  suicidal ideation, negative for plan or intent, able to contract no self harm and identify barriers to suicide.  Negative for obsessions, compulsions or psychosis.     Fund of Knowledge Intact   Insight Fair   Judgement Fair   Attention Span & Concentration Intact   Recent & Remote Memory Intact   Gait Normal   Muscle Tone Intact      Labs     Labs reviewed.  Recent Results (from the past 24 hour(s))   CBC with platelets    Collection Time: 06/27/19  8:34 PM   Result Value Ref Range    WBC 6.7 4.0 - 11.0 10e9/L    RBC Count 4.55 4.4 - 5.9 10e12/L    Hemoglobin 13.4 13.3 - 17.7 g/dL    Hematocrit 38.4 (L) 40.0 - 53.0 %    MCV 84 78 - 100 fl    MCH 29.5 26.5 - 33.0 pg    MCHC 34.9 31.5 - 36.5 g/dL    RDW 13.8 10.0 - 15.0 %    Platelet Count 211 150 - 450 10e9/L   Basic metabolic panel    Collection Time: 06/27/19  8:34 PM   Result Value Ref Range    Sodium 141 133 - 144 mmol/L    Potassium 4.1 3.4 - 5.3 mmol/L    Chloride 108 94 - 109 mmol/L    Carbon Dioxide 30 20 - 32 mmol/L    Anion Gap 3 3 - 14 mmol/L    Glucose 142 (H) 70 - 99 mg/dL    Urea Nitrogen 23 7 - 30 mg/dL    Creatinine 0.88 0.66 - 1.25 mg/dL    GFR Estimate >90 >60 mL/min/[1.73_m2]    GFR Estimate If Black >90 >60 mL/min/[1.73_m2]    Calcium 9.0 8.5 - 10.1 mg/dL   Hemoglobin A1c    Collection Time: 06/27/19  8:34 PM   Result Value Ref Range    Hemoglobin A1C 5.7 (H) 0 - 5.6 %   EKG 12-lead, tracing only    Collection Time: 06/28/19  5:38 AM   Result Value Ref Range    Interpretation ECG Click View Image link to view waveform and result           Impression     This is a 59 year old  male with a longstanding history of major depression and borderline personality disorder. He has multiple inpatient hospitalizations, ECT psychiatric treatment, along with numerous psychiatric medication trials. He has followed Dr. Hill as an outpatient at [facility] for the past few years. He was presented here to [facility] on 6/27 as a direct admit from  Dr. Hill's office due to patient experencing an increase in symptoms of depression. At the outpatient office, the patient presented very hopeless along with feeling overwhelmed emotionally. He had been experiencing life stressors such as work and caring for his daughter with Asperger's. Dr. Hill deemed the patient to be appropriate for ECT treatment since the patient had responded well to this form of psychiatric treatment in the past. He received his first session of ECT on 6/28/19.     Assessment of Suicide Risk: Patient is currently negative for suicidal ideation, negative for plan or intent, able to contract no self harm and identify barriers to suicide.  Negative for obsessions, compulsions or psychosis.       Diagnoses     1. Major depression, recurrent, severe, without psychotic features.  2. Borderline Personality Disorder      Plan     1. Explained side effects, benefits, and complications of medications to the patient, Pt gave verbal consent.  2. Medication changes: None   3. Discussed treatment plan with patient and team.  4. Projected length of stay: 2 weeks  5. ECT #2 on 7/01/19        Attestation:   Patient has been seen and evaluated by me, Jose Hill MD.    Patient ID:  Name: Bull Garza MRN: [id number]  Admission: 6/27/2019 YOB: 1960

## 2019-06-28 NOTE — PLAN OF CARE
59 year old male received as a direct admit from Dr. Tobar OP clinic. Pt. Severely depressed with suicidal ideation (contracts for safety in the hospital) Referred in for ECT (which has been helpful in the past)  Cognitive slowing with difficulty organizing thoughts and expressing his thoughts. Very hopeless on presentation. Reports feeling overwhelmed emotionally. Stressed by work and issues with adult daughter with Asperger's who lives with patient and his wife. Plan: in process of clearing patient for ECT to start tomorrow.

## 2019-06-28 NOTE — PROCEDURES
[facility] ECT Procedure Note     Bull Garza [id number]   59 year old 1960     Patient Status: Inpatient     Allergies   Allergen Reactions     Nka [No Known Allergies]        Weight:  229 lbs 9.6 oz          Diagnosis:       Major Depression      Indications for ECT:     Medications ineffective     Clinical Narrative:     ECT administered by thymatron machine, consent signed, side effects, risks, benefits reviewed.     Pause for the Cause:     Right patient Yes   Right procedure/laterality settings: Yes   Right diagnosis Yes      Intra-Procedure Documentation:     Date:  6/28/2019  Time:  7:50 AM    ECT #    Treatment number this series: 1   Total treatment number: 6     Type of ECT: Bilateral    ECT Medications:      Succinyl Choline: 100mg  Brevital: 100mg     ECT Strip Summary:   Energy Level: 50 percent  Motor Seizure Duration: 65 seconds  EEG Seizure Duration: 65 seconds    Complications: None    Plan: ECT #2 on 7/01/19   My Asthma Action Plan  Name: Teri Bain   YOB: 1955  Date: 5/24/2017   My doctor: Norma Serrano MD   My clinic: [facility]        My Control Medicine: Flunisolide HFA 80 MCG/ACT AERS  My Rescue Medicine: albuterol (PROAIR HFA/PROVENTIL HFA/VENTOLIN HFA) 108 (90 BASE) MCG/ACT Inhaler   My Asthma Severity: intermittent  Avoid your asthma triggers:                GREEN ZONE     Good Control    I feel good    No cough or wheeze    Can work, sleep and play without asthma symptoms       Take your asthma control medicine every day.     1. If exercise triggers your asthma, take your rescue medication    15 minutes before exercise or sports, and    During exercise if you have asthma symptoms  2. Spacer to use with inhaler: If you have a spacer, make sure to use it with your inhaler             YELLOW ZONE     Getting Worse  I have ANY of these:    I do not feel good    Cough or wheeze    Chest feels tight    Wake up at night   1. Keep taking your Green Zone medications  2. Start taking your rescue medicine:    every 20 minutes for up to 1 hour. Then every 4 hours for 24-48 hours.  3. If you stay in the Yellow Zone for more than 12-24 hours, contact your doctor.  4. If you do not return to the Green Zone in 12-24 hours or you get worse, start taking your oral steroid medicine if prescribed by your provider.           RED ZONE     Medical Alert - Get Help  I have ANY of these:    I feel awful    Medicine is not helping    Breathing getting harder    Trouble walking or talking    Nose opens wide to breathe       1. Take your rescue medicine NOW  2. If your provider has prescribed an oral steroid medicine, start taking it NOW  3. Call your doctor NOW  4. If you are still in the Red Zone after 20 minutes and you have not reached your doctor:    Take your rescue medicine again and    Call 911 or go to the emergency room right away    See your regular doctor within 2 weeks of an Emergency  Room or Urgent Care visit for follow-up treatment.        Electronically signed by: Aleja Orellana, May 24, 2017    Annual Reminders:  Meet with Asthma Educator,  Flu Shot in the Fall, consider Pneumonia Vaccination for patients with asthma (aged 19 and older).    Pharmacy:    [facility] [address] [place] [address] AT [facility] OF  [address]  [employer] - A MAIL ORDER PHMACY                    Asthma Triggers  How To Control Things That Make Your Asthma Worse    Triggers are things that make your asthma worse.  Look at the list below to help you find your triggers and what you can do about them.  You can help prevent asthma flare-ups by staying away from your triggers.      Trigger                                                          What you can do   Cigarette Smoke  Tobacco smoke can make asthma worse. Do not allow smoking in your home, car or around you.  Be sure no one smokes at a child s day care or school.  If you smoke, ask your health care provider for ways to help you quit.  Ask family members to quit too.  Ask your health care provider for a referral to Quit Plan to help you quit smoking, or call 6-206-773-PLAN.     Colds, Flu, Bronchitis  These are common triggers of asthma. Wash your hands often.  Don t touch your eyes, nose or mouth.  Get a flu shot every year.     Dust Mites  These are tiny bugs that live in cloth or carpet. They are too small to see. Wash sheets and blankets in hot water every week.   Encase pillows and mattress in dust mite proof covers.  Avoid having carpet if you can. If you have carpet, vacuum weekly.   Use a dust mask and HEPA vacuum.   Pollen and Outdoor Mold  Some people are allergic to trees, grass, or weed pollen, or molds. Try to keep your windows closed.  Limit time out doors when pollen count is high.   Ask you health care provider about taking medicine during allergy season.     Animal Dander  Some people are allergic to skin flakes, urine or  saliva from pets with fur or feathers. Keep pets with fur or feathers out of your home.    If you can t keep the pet outdoors, then keep the pet out of your bedroom.  Keep the bedroom door closed.  Keep pets off cloth furniture and away from stuffed toys.     Mice, Rats, and Cockroaches  Some people are allergic to the waste from these pests.   Cover food and garbage.  Clean up spills and food crumbs.  Store grease in the refrigerator.   Keep food out of the bedroom.   Indoor Mold  This can be a trigger if your home has high moisture. Fix leaking faucets, pipes, or other sources of water.   Clean moldy surfaces.  Dehumidify basement if it is damp and smelly.   Smoke, Strong Odors, and Sprays  These can reduce air quality. Stay away from strong odors and sprays, such as perfume, powder, hair spray, paints, smoke incense, paint, cleaning products, candles and new carpet.   Exercise or Sports  Some people with asthma have this trigger. Be active!  Ask your doctor about taking medicine before sports or exercise to prevent symptoms.    Warm up for 5-10 minutes before and after sports or exercise.     Other Triggers of Asthma  Cold air:  Cover your nose and mouth with a scarf.  Sometimes laughing or crying can be a trigger.  Some medicines and food can trigger asthma.

## 2019-06-28 NOTE — PLAN OF CARE
Pt presents with flat blunt affect and calm mood. Spent the entire shift bed resting in his room after ECT (1/6). Polite towards staff with no concerns at this time. Med compliant, no Si.

## 2019-06-28 NOTE — PROGRESS NOTES
06/27/19 1949   Patient Belongings   Did you bring any home meds/supplements to the hospital?  No   Patient Belongings other (see comments)   Belongings Search Yes   Clothing Search Yes   Second Staff Caitlin Ramachandran Pt's belongings searched and put away in the locker      iPhone      Toilettaries   Socks x4  Underwear x3  Shirts x4  Jeans x3  Shoes with laces   Belt   [place] 's licence  [facility] Insurance card              Admission:  I am responsible for any personal items that are not sent to the safe or pharmacy.  [facility] is not responsible for loss, theft or damage of any property in my possession.    Signature:  _________________________________ Date: _______  Time: _____                                              Staff Signature:  ____________________________ Date: ________  Time: _____      2nd Staff person, if patient is unable/unwilling to sign:    Signature: ________________________________ Date: ________  Time: _____     Discharge:  [facility] has returned all of my personal belongings:    Signature: _________________________________ Date: ________  Time: _____                                          Staff Signature:  ____________________________ Date: ________  Time: _____

## 2019-06-29 LAB — INTERPRETATION ECG - MUSE: NORMAL

## 2019-06-29 PROCEDURE — 12400000 ZZH R&B MH

## 2019-06-29 PROCEDURE — 25000132 ZZH RX MED GY IP 250 OP 250 PS 637: Performed by: PSYCHIATRY & NEUROLOGY

## 2019-06-29 RX ADMIN — VORTIOXETINE 20 MG: 20 TABLET, FILM COATED ORAL at 08:19

## 2019-06-29 RX ADMIN — CHOLECALCIFEROL TAB 50 MCG (2000 UNIT) 2000 UNITS: 50 TAB at 08:18

## 2019-06-29 RX ADMIN — LOSARTAN POTASSIUM 50 MG: 50 TABLET ORAL at 08:19

## 2019-06-29 RX ADMIN — ESZOPICLONE 3 MG: 3 TABLET, COATED ORAL at 23:09

## 2019-06-29 RX ADMIN — IBUPROFEN 800 MG: 400 TABLET ORAL at 08:19

## 2019-06-29 RX ADMIN — HYDROCHLOROTHIAZIDE 12.5 MG: 12.5 CAPSULE ORAL at 08:18

## 2019-06-29 RX ADMIN — FENOFIBRATE 160 MG: 160 TABLET ORAL at 08:18

## 2019-06-29 RX ADMIN — OXYMETAZOLINE HYDROCHLORIDE 2 SPRAY: 0.05 SPRAY NASAL at 23:09

## 2019-06-29 ASSESSMENT — ACTIVITIES OF DAILY LIVING (ADL)
DRESS: STREET CLOTHES
LAUNDRY: WITH SUPERVISION
ORAL_HYGIENE: INDEPENDENT
HYGIENE/GROOMING: INDEPENDENT
HYGIENE/GROOMING: INDEPENDENT
LAUNDRY: WITH SUPERVISION
DRESS: STREET CLOTHES
ORAL_HYGIENE: INDEPENDENT

## 2019-06-29 NOTE — PLAN OF CARE
Head to toe assessment WDL ex complaints about generalized aches. Relieved with scheduled ibuprofen, low grade temp and bradycardia. A/O. Participates in the milieu. Participates in programing. States that ECT is discombobulating and that it historically helps with the depression but does not fully clear it. Admits to passive SI but contracts for safety.

## 2019-06-29 NOTE — PLAN OF CARE
"Pt mood is calm with flat affect. Thought process is organized, insight is impaired. Had ECT this morning, believes it went well. Pt moved to Step Down Unit & expressed his preference for it but was isolative in his room all shift. Pt reported experiencing some suicidal ideation, said he \"felt unsafe\" and that he had vague, general suicidal ideas for most of the morning. Pt said these thoughts went away in the afternoon and can contract for safety.  "

## 2019-06-29 NOTE — PLAN OF CARE
Head to toe assessment WDL ex bradycardia at 46 BPM. According to notes, care team is aware. Patient states that he wore a home heart monitor to track arrhythmias for this but came out clear. Spent the majority of the shift spending time with visitors.

## 2019-06-30 PROCEDURE — 25000132 ZZH RX MED GY IP 250 OP 250 PS 637: Performed by: PSYCHIATRY & NEUROLOGY

## 2019-06-30 PROCEDURE — 12400000 ZZH R&B MH

## 2019-06-30 RX ADMIN — CHOLECALCIFEROL TAB 50 MCG (2000 UNIT) 2000 UNITS: 50 TAB at 07:50

## 2019-06-30 RX ADMIN — ESZOPICLONE 3 MG: 3 TABLET, COATED ORAL at 21:44

## 2019-06-30 RX ADMIN — OXYMETAZOLINE HYDROCHLORIDE 2 SPRAY: 0.05 SPRAY NASAL at 21:43

## 2019-06-30 RX ADMIN — FENOFIBRATE 160 MG: 160 TABLET ORAL at 07:50

## 2019-06-30 RX ADMIN — LOSARTAN POTASSIUM 50 MG: 50 TABLET ORAL at 07:50

## 2019-06-30 RX ADMIN — HYDROCHLOROTHIAZIDE 12.5 MG: 12.5 CAPSULE ORAL at 07:50

## 2019-06-30 RX ADMIN — IBUPROFEN 800 MG: 400 TABLET ORAL at 07:50

## 2019-06-30 RX ADMIN — VORTIOXETINE 20 MG: 20 TABLET, FILM COATED ORAL at 07:50

## 2019-06-30 NOTE — PLAN OF CARE
[id number]: Visited with wife throughout shift. Attended wrap up group. Denies I. ECT 1/6 completed. Brdaycardic (normal for pt).

## 2019-06-30 NOTE — PLAN OF CARE
"Pt spent a majority of the shift in the [employer] tv, pt went to community meeting this shift. Pt ate all of his meals. On a 1-1 pt expressed his life stressor due to work and family. Pt feel as if he failed his daughters, pt has 3 daughter and one of them has Asperger (28yr), pt becomes anxious when explaining his thoughts an emotions. Pt states that he wants to get better, feels that ECT can only do so much but would like more therapy or \"treatment\" that meets than once a week. Pt has trauma that he has yet to ever address. Pt is very respectful to staff and peers. Pt wife came and visited this shift. Pt mood is calm/sad, with blunt/flat affect. Thought process is intact. Behavior is adequate for situation. Insight is poor at times. Pt has suicidal ideation, no plan. Pt contracts for safety on the unit.   "

## 2019-06-30 NOTE — PROGRESS NOTES
Eszopiclone tablet found in patient room.  RNs will perform thorough mouth checks after administering PO medications and have pt gargle with water.

## 2019-07-01 ENCOUNTER — ANESTHESIA (OUTPATIENT)
Dept: SURGERY | Facility: CLINIC | Age: 59
End: 2019-07-01

## 2019-07-01 ENCOUNTER — ANESTHESIA EVENT (OUTPATIENT)
Dept: SURGERY | Facility: CLINIC | Age: 59
End: 2019-07-01

## 2019-07-01 PROCEDURE — 25800030 ZZH RX IP 258 OP 636: Performed by: ANESTHESIOLOGY

## 2019-07-01 PROCEDURE — 25000132 ZZH RX MED GY IP 250 OP 250 PS 637: Performed by: PSYCHIATRY & NEUROLOGY

## 2019-07-01 PROCEDURE — 12400000 ZZH R&B MH

## 2019-07-01 PROCEDURE — 90853 GROUP PSYCHOTHERAPY: CPT

## 2019-07-01 PROCEDURE — 90870 ELECTROCONVULSIVE THERAPY: CPT

## 2019-07-01 PROCEDURE — 25000128 H RX IP 250 OP 636: Performed by: NURSE ANESTHETIST, CERTIFIED REGISTERED

## 2019-07-01 PROCEDURE — 25800030 ZZH RX IP 258 OP 636: Performed by: SURGERY

## 2019-07-01 PROCEDURE — 25000125 ZZHC RX 250: Performed by: NURSE ANESTHETIST, CERTIFIED REGISTERED

## 2019-07-01 RX ORDER — SODIUM CHLORIDE, SODIUM LACTATE, POTASSIUM CHLORIDE, CALCIUM CHLORIDE 600; 310; 30; 20 MG/100ML; MG/100ML; MG/100ML; MG/100ML
500 INJECTION, SOLUTION INTRAVENOUS CONTINUOUS
Status: DISCONTINUED | OUTPATIENT
Start: 2019-07-01 | End: 2019-07-03 | Stop reason: HOSPADM

## 2019-07-01 RX ORDER — IBUPROFEN 400 MG/1
800 TABLET, FILM COATED ORAL 2 TIMES DAILY PRN
Status: DISCONTINUED | OUTPATIENT
Start: 2019-07-01 | End: 2019-07-03 | Stop reason: HOSPADM

## 2019-07-01 RX ADMIN — SODIUM CHLORIDE, POTASSIUM CHLORIDE, SODIUM LACTATE AND CALCIUM CHLORIDE 500 ML: 600; 310; 30; 20 INJECTION, SOLUTION INTRAVENOUS at 06:30

## 2019-07-01 RX ADMIN — METHOHEXITAL SODIUM 100 MG: 500 INJECTION, POWDER, LYOPHILIZED, FOR SOLUTION INTRAMUSCULAR; INTRAVENOUS; RECTAL at 06:50

## 2019-07-01 RX ADMIN — ESZOPICLONE 3 MG: 3 TABLET, COATED ORAL at 22:46

## 2019-07-01 RX ADMIN — VORTIOXETINE 20 MG: 20 TABLET, FILM COATED ORAL at 07:42

## 2019-07-01 RX ADMIN — SUCCINYLCHOLINE CHLORIDE 100 MG: 20 INJECTION, SOLUTION INTRAMUSCULAR; INTRAVENOUS; PARENTERAL at 06:50

## 2019-07-01 RX ADMIN — SODIUM CHLORIDE, POTASSIUM CHLORIDE, SODIUM LACTATE AND CALCIUM CHLORIDE: 600; 310; 30; 20 INJECTION, SOLUTION INTRAVENOUS at 06:25

## 2019-07-01 RX ADMIN — HYDROCHLOROTHIAZIDE 12.5 MG: 12.5 CAPSULE ORAL at 07:42

## 2019-07-01 RX ADMIN — OXYMETAZOLINE HYDROCHLORIDE 2 SPRAY: 0.05 SPRAY NASAL at 22:46

## 2019-07-01 RX ADMIN — FENOFIBRATE 160 MG: 160 TABLET ORAL at 07:42

## 2019-07-01 RX ADMIN — IBUPROFEN 800 MG: 400 TABLET ORAL at 17:58

## 2019-07-01 RX ADMIN — IBUPROFEN 800 MG: 400 TABLET ORAL at 07:42

## 2019-07-01 RX ADMIN — CHOLECALCIFEROL TAB 50 MCG (2000 UNIT) 2000 UNITS: 50 TAB at 07:42

## 2019-07-01 RX ADMIN — LOSARTAN POTASSIUM 50 MG: 50 TABLET ORAL at 07:42

## 2019-07-01 ASSESSMENT — ACTIVITIES OF DAILY LIVING (ADL)
DRESS: STREET CLOTHES;INDEPENDENT
HYGIENE/GROOMING: INDEPENDENT
LAUNDRY: WITH SUPERVISION
ORAL_HYGIENE: INDEPENDENT

## 2019-07-01 ASSESSMENT — ENCOUNTER SYMPTOMS: SEIZURES: 0

## 2019-07-01 ASSESSMENT — LIFESTYLE VARIABLES: TOBACCO_USE: 0

## 2019-07-01 ASSESSMENT — MIFFLIN-ST. JEOR: SCORE: 1891.27

## 2019-07-01 NOTE — PLAN OF CARE
BEHAVIORAL TEAM DISCUSSION    Participants: MD, CM, RN, PA, OT  Progress: Suicidal Ideation over weekend  Continued Stay Criteria/Rationale: Continue Course of ECT Treatments for depression  Medical/Physical: None  Precautions:   Behavioral Orders   Procedures    Code 1 - Restrict to Unit    Code 2 - 1:1 Staff Supervision     For ECT only    Electroconvulsive therapy     Series of up to 12 treatments. Begin Date: 6/28/19      Treating Psychiatrist providing ECT:  Dr Silva      Notified on:  6/28/19    Electroconvulsive therapy     Series of up to 6 treatments. Begin Date: 6/28/19     Treating Psychiatrist providing ECT:  Dr. Silva   Notified on:  6/27/19    Fall precautions    Routine Programming     As clinically indicated    Status 15     Every 15 minutes.     Plan: ECT Treatments  Rationale for change in precautions or plan: Continue complete course of ECT Treatments for stabilization of depression

## 2019-07-01 NOTE — PROCEDURES
[facility] ECT Procedure Note     Bull Garza [id number]   59 year old 1960     Patient Status: Inpatient     Allergies   Allergen Reactions     Nka [No Known Allergies]        Weight:  230 lbs 0 oz          Diagnosis:       Major Depression      Indications for ECT:     Medications ineffective     Clinical Narrative:     ECT administered by thymatron machine, consent signed, side effects, risks, benefits reviewed.     Pause for the Cause:     Right patient Yes   Right procedure/laterality settings: Yes   Right diagnosis Yes      Intra-Procedure Documentation:     Date:  7/1/2019  Time:  7:50 AM    ECT #    Treatment number this series: 2   Total treatment number: 6     Type of ECT: Bilateral    ECT Medications:      Succinyl Choline: 100mg  Brevital: 100mg     ECT Strip Summary:   Energy Level: 60 percent  Motor Seizure Duration: 35 seconds  EEG Seizure Duration: 35 seconds    Complications: None    Plan: ECT #3 on 7/03/19

## 2019-07-01 NOTE — PROGRESS NOTES
[facility] Psychiatric Progress Note       Interim History     The patient's care was discussed with the treatment team and chart notes were reviewed. Patient was deemed to be very respectful and pleasant over the weekend. Patient had expressed suicidal ideation throughout the weekend, however was able to contract for safety. He reported to staff on multiple occassions that he wants to get better and would like to do more than ECT to do so. Pt seen on 7/01/19 by Dr. Hill. Patient underwent his ECT session this morning, where there were no complications. He denied experiencing any common side effects such as confusion, headache, or nausea. He will continue with his third session on 7/03/9 as inpatient. Dr. Hill will not make any medication adjustments today.      Hospital Course   This is a 59 year old  male with a longstanding history of major depression and borderline personality disorder. He has multiple inpatient hospitalizations, ECT psychiatric treatment, along with numerous psychiatric medication trials. He has followed Dr. Hill as an outpatient at [facility] for the past few years. He was presented here to [facility] on 6/27 as a direct admit from Dr. Hill's office due to patient experencing an increase in symptoms of depression. At the outpatient office, the patient presented very hopeless along with feeling overwhelmed emotionally. He had been experiencing life stressors such as work and caring for his daughter with Asperger's. Dr. Hill deemed the patient to be appropriate for ECT treatment since the patient had responded well to this form of psychiatric treatment in the past. He received his first session of ECT on 6/28/19.      Medications     Current Facility-Administered Medications Ordered in Epic   Medication Dose Route Frequency Last Rate Last Dose     [Auto Hold] eszopiclone (LUNESTA) tablet 3 mg  3 mg Oral At Bedtime   3 mg at 06/30/19  2144     [Auto Hold] fenofibrate (TRIGLIDE/LOFIBRA) tablet 160 mg  160 mg Oral Daily with breakfast   160 mg at 06/30/19 0750     [Auto Hold] losartan (COZAAR) tablet 50 mg  50 mg Oral Daily   50 mg at 06/30/19 0750    And     [Auto Hold] hydrochlorothiazide (MICROZIDE) capsule 12.5 mg  12.5 mg Oral Daily   12.5 mg at 06/30/19 0750     [Auto Hold] hydrOXYzine (ATARAX) tablet 25 mg  25 mg Oral Q4H PRN         [Auto Hold] ibuprofen (ADVIL/MOTRIN) tablet 800 mg  800 mg Oral QAM   800 mg at 06/30/19 0750     lactated ringers infusion  500 mL Intravenous Continuous 25 mL/hr at 07/01/19 0630 500 mL at 07/01/19 0630     lactated ringers infusion   Intravenous Continuous 10 mL/hr at 07/01/19 0625       lidocaine 1 % 0.1-1 mL  0.1-1 mL Other Q1H PRN         methohexital (BREVITAL SODIUM) injection    PRN   100 mg at 07/01/19 0650     [Auto Hold] oxymetazoline (AFRIN) 0.05 % spray 2 spray  2 spray Both Nostrils At Bedtime   2 spray at 06/30/19 2143     sodium chloride (PF) 0.9% PF flush 3 mL  3 mL Intracatheter q1 min prn         succinylcholine (ANECTINE) injection    PRN   100 mg at 07/01/19 0650     [Auto Hold] vitamin D3 (CHOLECALCIFEROL) 2000 units (50 mcg) tablet 2,000 Units  2,000 Units Oral Daily   2,000 Units at 06/30/19 0750     [Auto Hold] vortioxetine (TRINTELLIX/BRINTELLIX) tablet 20 mg  20 mg Oral Daily   20 mg at 06/30/19 0750     No current Epic-ordered outpatient medications on file.         Allergies      Allergies   Allergen Reactions     Nka [No Known Allergies]         Medical Review of Systems     /63 (BP Location: Right arm)   Pulse 51   Temp 97.5  F (36.4  C) (Oral)   Resp 14   Ht 1.829 m (6')   Wt 104.3 kg (230 lb)   SpO2 95%   BMI 31.19 kg/m    Body mass index is 31.19 kg/m .  A 10-point review of systems was performed by Jose Hill MD and is negative, no new findings.      Psychiatric Examination     Appearance Sitting in chair, dressed in normal clothing. Appears stated age.    Attitude Cooperative   Orientation Oriented to person, place, time   Eye Contact Good   Speech Regular rate, rhythm, volume and tone   Language Normal   Psychomotor Behavior Normal   Mood Less depressed   Affect Very Pleasant   Thought Process Goal-Oriented, Intact   Associations Intact   Thought Content Patient is currently negative for suicidal ideation, negative for plan or intent, able to contract no self harm and identify barriers to suicide.  Negative for obsessions, compulsions or psychosis.     Fund of Knowledge Intact   Insight Somewhat poor at times   Judgement Intact   Attention Span & Concentration Intact   Recent & Remote Memory Intact   Gait Normal   Muscle Tone Intact        Labs     Labs reviewed.  No results found for this or any previous visit (from the past 24 hour(s)).     Impression      This is a 59 year old  male with a longstanding history of major depression and borderline personality disorder. The patient was very pleasant and respectful over the weekend to both his peers and staff members. He heavily stressed a desire to get better and was able to acknowledge that ECT has been helpful in the past, however wants even more in regards of care. It was recorded that patient had suicidal ideation over the weekend. Patient underwent his second ECT session this morning, where there were no complications. He denied experiencing any side effects, such as nausea, headache, or confusion. He will continue with his third session on 7/03. Aside from ECT, patient denied any concerns in regards of his medication regimen or overall care. Dr. Hill made no adjustments to medications. Continue medications as is.      Diagnoses     1. Major depression, recurrent, severe, without psychotic features.  2. Borderline Personality Disorder      Plan     1. Explained side effects, benefits, and complications of medications to the patient, Pt gave verbal consent.  2. Medication changes: None    3. Discussed treatment plan with patient and team.  4. Projected length of stay: 7+ days  5. ECT #3 on 7/03/19      Attestation:   Patient has been seen and evaluated by me, Jose Hill MD.    Patient ID:  Name: Bull Garza    MRN: [id number]  Admission: 6/27/2019   YOB: 1960

## 2019-07-01 NOTE — PLAN OF CARE
Pt presents with a flat, calm affect and a depressed mood. Pt stated that today was much better than yesterday. Pt spent much of the shift in the lounge watching TV with peers and visiting with he wife. Pt stated that his wife is very supportive of him, but that he feels guilty that she has to be so supportive and strong for him. Pt shows little insight  into his situation. Pt endorses SI but contracts for safety. Pt has ECT in AM. Blue pill was found on floor of his room.

## 2019-07-01 NOTE — PLAN OF CARE
Pt mood is calm with full range affect. Thought process is slow, insight is fair. Pt attended groups, participating appropriately. Social in lounge, polite with staff. Food and med compliant. Pt endorses vague suicidal thoughts but can remain safe on unit.

## 2019-07-01 NOTE — PROGRESS NOTES
07/01/19 1500   General Information   Date Initially Attended OT 07/01/19   Clinical Impression   Affect Restricted   Orientation Oriented to person, place and time   Appearance and ADLs General cleanliness observed in most areas   Attention to Internal Stimuli No observed signs   Interaction Skills Interacts appropriately with peers;Interacts appropriately with staff   Ability to Communicate Needs Independent   Verbal Content Appropriate to topic   Ability to Maintain Boundaries Maintains appropriate physical boundaries;Maintains appropriate verbal boundaries   Participation Participates with minimal encouragement   Concentration Concentrates 5-10 minutes   Ability to Concentrate With structure   Follows and Comprehends Directions Independently follows multi-step directions   Memory Delayed and immediate recall intact   Organization Independently organizes medium tasks   Decision Making Needs further assessment   Planning and Problem Solving Needs further assessment   Ability to Apply and Learn Concepts Applies within group structure   Frustrations / Stress Tolerance Needs further assessment   Level of Insight Identifies needs with structure/support   Self Esteem Poor self esteem   Social Supports Identifies utilizing supports   General Observation/Plan   General Observations/Plan See Comments     INITIAL O.T. ASSESSMENT:      Pt transitioned to OT group with MIN encouragement. With MIN encouragement, pt participated in group meal prep task, which was utilized to promote and assess communication and socialization and increase role competence. Pt required prompting to initiate participation and brightened with activity. Pt will continue to benefit from OT intervention to address implementation of positive functional coping skills, role performance, and community reintegration.      OT assessment completed by semi-structured interview and direct observation. Cited depression as the reason for current  hospitalization. Goals ID'd include to increase motivation, to ID and express feelings better, and to manage time better. OT staff explained the purpose of pt being involved in current treatment plan.      Plan: Encourage ongoing attendance as able to meet self-stated goals, implement positive coping skills, engage in therapeutic activities, and provide assessment ongoing.
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
## 2019-07-01 NOTE — ANESTHESIA POSTPROCEDURE EVALUATION
Patient: Bull Garza    * No procedures listed *    Diagnosis:* No pre-op diagnosis entered *  Diagnosis Additional Information: No value filed.    Anesthesia Type:  General    Note:  Anesthesia Post Evaluation    Patient location during evaluation: Bedside  Patient participation: Able to fully participate in evaluation  Level of consciousness: awake and alert  Pain management: adequate  Airway patency: patent  Cardiovascular status: acceptable  Respiratory status: acceptable  Hydration status: acceptable  PONV: none       Comments: Bigeminal PVCs noted on ECG immediately post-ECT.  Patient ventilated with oral airway, with all other vitals stable.  Spontaneous conversion to NSR after spontaneous ventilation resumed.          Last vitals:  Vitals:    07/01/19 0705 07/01/19 0710 07/01/19 0715   BP: (!) 161/94 (!) 161/94 149/78   Pulse:  89 83   Resp: 19 13 17   Temp:      SpO2: 95% 97% 100%         Electronically Signed By: Vish Montes MD  July 1, 2019  7:17 AM

## 2019-07-02 PROCEDURE — 25000132 ZZH RX MED GY IP 250 OP 250 PS 637: Performed by: PSYCHIATRY & NEUROLOGY

## 2019-07-02 PROCEDURE — 90853 GROUP PSYCHOTHERAPY: CPT

## 2019-07-02 PROCEDURE — 12400000 ZZH R&B MH

## 2019-07-02 PROCEDURE — 90791 PSYCH DIAGNOSTIC EVALUATION: CPT

## 2019-07-02 RX ADMIN — FENOFIBRATE 160 MG: 160 TABLET ORAL at 08:03

## 2019-07-02 RX ADMIN — ESZOPICLONE 3 MG: 3 TABLET, COATED ORAL at 22:34

## 2019-07-02 RX ADMIN — IBUPROFEN 800 MG: 400 TABLET ORAL at 08:11

## 2019-07-02 RX ADMIN — CHOLECALCIFEROL TAB 50 MCG (2000 UNIT) 2000 UNITS: 50 TAB at 08:03

## 2019-07-02 RX ADMIN — VORTIOXETINE 20 MG: 20 TABLET, FILM COATED ORAL at 08:03

## 2019-07-02 RX ADMIN — OXYMETAZOLINE HYDROCHLORIDE 2 SPRAY: 0.05 SPRAY NASAL at 22:33

## 2019-07-02 ASSESSMENT — ACTIVITIES OF DAILY LIVING (ADL)
ORAL_HYGIENE: INDEPENDENT
LAUNDRY: WITH SUPERVISION
HYGIENE/GROOMING: INDEPENDENT
DRESS: STREET CLOTHES
DRESS: STREET CLOTHES
HYGIENE/GROOMING: INDEPENDENT
ORAL_HYGIENE: INDEPENDENT
LAUNDRY: WITH SUPERVISION

## 2019-07-02 ASSESSMENT — MIFFLIN-ST. JEOR: SCORE: 1883.56

## 2019-07-02 NOTE — H&P
Case Management Social History    This information has been obtained from the patient's chart and through a personal interview with patient. Patient is deemed to be a reliable historian.     Reason for Admission:  Bull Garza is a 59 year old  male admitted to [facility] on 06/27/19. He is currently hospitalized voluntarily. He states that he went to Dr. Hill's office last week as he has been having a hard time recently. He states that he had become overwhelmed and was not thinking straight. He feels that his depression had been increasing due to stress. He denies any thoughts of suicidal or self harm at this time. He contracts for safety while hospitalized. Stressors for him include work, life at home and caring for his youngest daughter who has Asperger's.       Previous Mental & Chemical Dependency History: He has several past mental health admissions at [facility] and [facility] [place] [place] dating back to 2010. His most recent admission was at [facility] in March 2017. He has done ECT previously. He has no past history of DBT or civil commitment.     He states that he drinks approximately a 30 oz cup of coffee with caffeine daily. He endorses drinking alcohol occasionally. He denies smoking cigarettes, gambling or using illicit drugs. He has no prior history of chemical dependency treatment.        Social History:  He is  to his wife, Daily Garza. He and his wife have three daughters. He was born and raised in [place]. His parents currently reside in [place]. He the second oldest of six siblings. He has one sister and four brothers. There is no known family history of mental health issues.       Yazidi: He identifies as Druze. He does not wish to see a  with this hospitalization.        History: He has no prior history of  service.       Education and Work History:  He graduated from [employer] in 1977.  Following graduation he attended [employer] for two years. He currently works as a  for [employer] in [place]. He has worked there for the past four years. He states that he last worked on Wednesday of last week. His wife works as a paraprofessional at [employer].       Living Situation:  He resides in a house in [place] with his wife and family.       Financial Status/Stressors:  He does not currently receive social security or disability income.       Medication Coverage:  He denies any current issues with affording his medication co-pays.       Insurance:  His insurance is [employer] [facility].       Legal Issues:  He denies any current legal issues. He is his own guardian.       Community Resources:  His primary care provider is Dr. Manda Babb DO with [facility] in [place]. He sees Dr. Jose Hill at [facility] for medication management. He does not currently have a therapist, but he is open to being referred to a therapist at discharge. He is able to drive to all of his appointments.       Social Functioning:  He enjoys woodworking, fishing and doing home repairs.         Discharge Considerations:  He would like to be referred to a therapist near his home in [place]. Case Management will remain available to assist with any discharge needs.

## 2019-07-02 NOTE — PLAN OF CARE
Pt transitioned to OT group with MIN encouragement and engaged in therapeutic activity addressing functional cognition and interpersonal skills with task set up and extended time. With task set up, pt participated in therapeutic group activity and discussion addressing stress management. Educated pt on different types of stress as well as common signs and symptoms of stress. Pt ID'd current and past stressors (e.g. Child leaving home) and coping skills used to manage stressors (e.g. Visualizing the mountains of [place]). Pt will continue to benefit from OT intervention to address implementation of positive functional coping skills, role performance, and community reintegration.

## 2019-07-02 NOTE — PROGRESS NOTES
[facility] Psychiatric Progress Note       Interim History     The patient's care was discussed with the treatment team and chart notes were reviewed. Patient was deemed to be very respectful and pleasant over the weekend. Patient had expressed suicidal ideation throughout the weekend, however was able to contract for safety. He reported to staff on multiple occassions that he wants to get better and would like to do more than ECT to do so. Pt seen on 7/02/19 by Dr. Hill. Patient underwent his ECT session this morning, where there were no complications. He denied experiencing any common side effects such as confusion, headache, or nausea. He will continue with his third session on 7/03/9 as inpatient. Dr. Hill will not make any medication adjustments today.      Hospital Course   This is a 59 year old  male with a longstanding history of major depression and borderline personality disorder. He has multiple inpatient hospitalizations, ECT psychiatric treatment, along with numerous psychiatric medication trials. He has followed Dr. Hill as an outpatient at [facility] for the past few years. He was presented here to [facility] on 6/27 as a direct admit from Dr. Hill's office due to patient experencing an increase in symptoms of depression. At the outpatient office, the patient presented very hopeless along with feeling overwhelmed emotionally. He had been experiencing life stressors such as work and caring for his daughter with Asperger's. Dr. Hill deemed the patient to be appropriate for ECT treatment since the patient had responded well to this form of psychiatric treatment in the past. He received his first session of ECT on 6/28/19 and his second ECT session on 7/01/19. Both procedures went without complication.      Medications     Current Facility-Administered Medications Ordered in Epic   Medication Dose Route Frequency Last Rate Last Dose      eszopiclone (LUNESTA) tablet 3 mg  3 mg Oral At Bedtime   3 mg at 07/01/19 2246     fenofibrate (TRIGLIDE/LOFIBRA) tablet 160 mg  160 mg Oral Daily with breakfast   160 mg at 07/01/19 0742     losartan (COZAAR) tablet 50 mg  50 mg Oral Daily   50 mg at 07/01/19 0742    And     hydrochlorothiazide (MICROZIDE) capsule 12.5 mg  12.5 mg Oral Daily   12.5 mg at 07/01/19 0742     hydrOXYzine (ATARAX) tablet 25 mg  25 mg Oral Q4H PRN         ibuprofen (ADVIL/MOTRIN) tablet 800 mg  800 mg Oral BID PRN   800 mg at 07/01/19 1758     lactated ringers infusion  500 mL Intravenous Continuous 25 mL/hr at 07/01/19 0630 500 mL at 07/01/19 0630     lidocaine 1 % 0.1-1 mL  0.1-1 mL Other Q1H PRN         oxymetazoline (AFRIN) 0.05 % spray 2 spray  2 spray Both Nostrils At Bedtime   2 spray at 07/01/19 2246     sodium chloride (PF) 0.9% PF flush 3 mL  3 mL Intracatheter q1 min prn         vitamin D3 (CHOLECALCIFEROL) 2000 units (50 mcg) tablet 2,000 Units  2,000 Units Oral Daily   2,000 Units at 07/01/19 0742     vortioxetine (TRINTELLIX/BRINTELLIX) tablet 20 mg  20 mg Oral Daily   20 mg at 07/01/19 0742     No current Epic-ordered outpatient medications on file.         Allergies      Allergies   Allergen Reactions     Nka [No Known Allergies]         Medical Review of Systems     /62   Pulse 61   Temp 98.5  F (36.9  C) (Oral)   Resp 16   Ht 1.829 m (6')   Wt 104.3 kg (230 lb)   SpO2 92%   BMI 31.19 kg/m    Body mass index is 31.19 kg/m .  A 10-point review of systems was performed by Jose Hill MD and is negative, no new findings.      Psychiatric Examination     Appearance Sitting in chair, dressed in normal clothing. Appears stated age.   Attitude Cooperative   Orientation Oriented to person, place, time   Eye Contact Good   Speech Regular rate, rhythm, volume and tone   Language Normal   Psychomotor Behavior Normal   Mood Less depressed   Affect Very Pleasant   Thought Process Goal-Oriented, Intact    Associations Intact   Thought Content Patient is currently negative for suicidal ideation, negative for plan or intent, able to contract no self harm and identify barriers to suicide.  Negative for obsessions, compulsions or psychosis.     Fund of Knowledge Intact   Insight Somewhat poor at times   Judgement Intact   Attention Span & Concentration Intact   Recent & Remote Memory Intact   Gait Normal   Muscle Tone Intact        Labs     Labs reviewed.  No results found for this or any previous visit (from the past 24 hour(s)).     Impression      This is a 59 year old  male with a longstanding history of major depression and borderline personality disorder. The patient was very pleasant and respectful over the weekend to both his peers and staff members. He heavily stressed a desire to get better and was able to acknowledge that ECT has been helpful in the past, however wants even more in regards of care. It was recorded that patient had suicidal ideation over the weekend. Patient underwent his second ECT session this morning, where there were no complications. He denied experiencing any side effects, such as nausea, headache, or confusion. He will continue with his third session on 7/03. Aside from ECT, patient denied any concerns in regards of his medication regimen or overall care. Dr. Hill made no adjustments to medications. Continue medications as is.      Diagnoses     1. Major depression, recurrent, severe, without psychotic features.  2. Borderline Personality Disorder      Plan     1. Explained side effects, benefits, and complications of medications to the patient, Pt gave verbal consent.  2. Medication changes: None   3. Discussed treatment plan with patient and team.  4. Projected length of stay: 7+ days  5. ECT #3 on 7/03/19      Attestation:   Patient has been seen and evaluated by me, Jose Hill MD.    Patient ID:  Name: Bull Garza    MRN: [id number]  Admission:  6/27/2019   YOB: 1960

## 2019-07-02 NOTE — PLAN OF CARE
Pt presents with a calm affect, appears depressed at times. Visible for the majority of the shift. Spent time in the lounge watching and socializing with other patients. Attended groups. Behavior is appropriate. During staff check-in pt reports feeling anxious and depressed; pt rated both of them at a 5/10. Denies any SI.

## 2019-07-02 NOTE — PLAN OF CARE
Pt presented with a tense affect and calm mood. Thought process is impaired and pt has poor insight. Exhibited appropriate behavior. Pt spent time working on his crosswords and socializing with peers. Denied SI, did not eat his dinner, attended a group and was med compliant.

## 2019-07-03 ENCOUNTER — ANESTHESIA EVENT (OUTPATIENT)
Dept: SURGERY | Facility: CLINIC | Age: 59
End: 2019-07-03

## 2019-07-03 ENCOUNTER — ANESTHESIA (OUTPATIENT)
Dept: SURGERY | Facility: CLINIC | Age: 59
End: 2019-07-03

## 2019-07-03 VITALS
TEMPERATURE: 99 F | BODY MASS INDEX: 30.92 KG/M2 | HEART RATE: 64 BPM | DIASTOLIC BLOOD PRESSURE: 72 MMHG | SYSTOLIC BLOOD PRESSURE: 136 MMHG | RESPIRATION RATE: 16 BRPM | WEIGHT: 228.3 LBS | HEIGHT: 72 IN | OXYGEN SATURATION: 95 %

## 2019-07-03 PROCEDURE — 90870 ELECTROCONVULSIVE THERAPY: CPT

## 2019-07-03 PROCEDURE — 25000132 ZZH RX MED GY IP 250 OP 250 PS 637: Performed by: PSYCHIATRY & NEUROLOGY

## 2019-07-03 PROCEDURE — GZB2ZZZ ELECTROCONVULSIVE THERAPY, BILATERAL-SINGLE SEIZURE: ICD-10-PCS | Performed by: PSYCHIATRY & NEUROLOGY

## 2019-07-03 PROCEDURE — 25000125 ZZHC RX 250: Performed by: NURSE ANESTHETIST, CERTIFIED REGISTERED

## 2019-07-03 PROCEDURE — 25800030 ZZH RX IP 258 OP 636: Performed by: SURGERY

## 2019-07-03 PROCEDURE — 90853 GROUP PSYCHOTHERAPY: CPT

## 2019-07-03 PROCEDURE — 25000128 H RX IP 250 OP 636: Performed by: NURSE ANESTHETIST, CERTIFIED REGISTERED

## 2019-07-03 PROCEDURE — 25000125 ZZHC RX 250: Performed by: SURGERY

## 2019-07-03 RX ADMIN — IBUPROFEN 800 MG: 400 TABLET ORAL at 08:12

## 2019-07-03 RX ADMIN — VORTIOXETINE 20 MG: 20 TABLET, FILM COATED ORAL at 08:12

## 2019-07-03 RX ADMIN — HYDROCHLOROTHIAZIDE 12.5 MG: 12.5 CAPSULE ORAL at 08:12

## 2019-07-03 RX ADMIN — METHOHEXITAL SODIUM 100 MG: 500 INJECTION, POWDER, LYOPHILIZED, FOR SOLUTION INTRAMUSCULAR; INTRAVENOUS; RECTAL at 06:43

## 2019-07-03 RX ADMIN — SUCCINYLCHOLINE CHLORIDE 100 MG: 20 INJECTION, SOLUTION INTRAMUSCULAR; INTRAVENOUS; PARENTERAL at 06:43

## 2019-07-03 RX ADMIN — FENOFIBRATE 160 MG: 160 TABLET ORAL at 08:12

## 2019-07-03 RX ADMIN — CHOLECALCIFEROL TAB 50 MCG (2000 UNIT) 2000 UNITS: 50 TAB at 08:12

## 2019-07-03 RX ADMIN — SODIUM CHLORIDE, POTASSIUM CHLORIDE, SODIUM LACTATE AND CALCIUM CHLORIDE 500 ML: 600; 310; 30; 20 INJECTION, SOLUTION INTRAVENOUS at 06:14

## 2019-07-03 RX ADMIN — LOSARTAN POTASSIUM 50 MG: 50 TABLET ORAL at 08:12

## 2019-07-03 RX ADMIN — LIDOCAINE HYDROCHLORIDE 0.2 ML: 10 INJECTION, SOLUTION EPIDURAL; INFILTRATION; INTRACAUDAL; PERINEURAL at 06:14

## 2019-07-03 ASSESSMENT — ENCOUNTER SYMPTOMS: SEIZURES: 0

## 2019-07-03 ASSESSMENT — LIFESTYLE VARIABLES: TOBACCO_USE: 0
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
## 2019-07-03 NOTE — PROCEDURES
[facility] ECT Procedure Note     uBll Garza [id number]   59 year old 1960     Patient Status: Inpatient     Allergies   Allergen Reactions     Nka [No Known Allergies]        Weight:  228 lbs 4.8 oz          Diagnosis:       Major Depression      Indications for ECT:     Medications ineffective     Clinical Narrative:     ECT administered by thymatron machine, consent signed, side effects, risks, benefits reviewed.     Pause for the Cause:     Right patient Yes   Right procedure/laterality settings: Yes   Right diagnosis Yes      Intra-Procedure Documentation:     Date:  7/3/2019  Time:  7:50 AM    ECT #    Treatment number this series: 3   Total treatment number: 6     Type of ECT: Bilateral    ECT Medications:      Succinyl Choline: 100mg  Brevital: 100mg     ECT Strip Summary:   Energy Level: 70 percent  Motor Seizure Duration: 35 seconds  EEG Seizure Duration: 35 seconds    Complications: None    Plan: ECT #4 on 7/05/19

## 2019-07-03 NOTE — DISCHARGE INSTRUCTIONS
Behavioral Discharge Planning and Instructions    Summary:  Admitted for worsening depression with suicidal ideation.     Main Diagnosis:  Major Depression, recurrent, severe, without psychotic features; Borderline Personality Disorder     Major Treatments, Procedures and Findings:  Psychiatric assessment. ECT.     Symptoms to Report: Losing more sleep or sleeping too much, Mood getting worse or Thoughts of suicide    Lifestyle Adjustment:  Follow all treatment recommendations. Develop and follow safety plan. Your safety plan is your contract with yourself to keep you safe in a crisis. Be sure to use the resources and crisis numbers listed on your safety plan. Do not drive for at least one week following your last ECT treatment. If you have lingering memory issues or impaired judgment, do not drive until you have been cleared to do so by your physician.     Psychiatry Follow-up:     You will complete your ECT series as an outpatient. Information listed above.  Once you complete your ECT series, please contact Dr Hill at his office to discuss when you should return to work. He will write you a return to work note once he can assess whether or not your memory and judgment have improved enough to return to work.     You have a follow up psychiatry appointment with Dr. Jose Hill at [facility] in [place] on Monday, July 29, 2019 at 12:30 pm.      [facility]  [address]  [place]  [address]  Phone:  [phone] / Fax:  [phone]    You have a therapy intake appointment with ANA LUISA Akhtar at the [facility] in [place] on Tuesday, July 16, 2019 at 3:00 pm.  Please bring your photo ID and insurance card with you to this appointment. Please call at least 24 hours in advance if you need to reschedule this appointment.     [facility]   [address], [address] [address]  [phone] / [phone] fax    Resources:   Crisis  Intervention: 865.836.7601 or 231-338-6235 (TTY: 954.231.3016).  Call anytime for help.  National [employer] Mental Illness (www.[place].sidney.org): 346.775.7004 or 236-325-2719.  National Suicide Prevention Line (www.mentalhealthmn.org): 260-904-DECB (5214)  [facility] Crisis Line - 24/7 mobile and telephone crisis response services in [facility]. One central access number: [phone] for all services.    General Medication Instructions:   See your medication sheet(s) for instructions.   Take all medicines as directed.  Make no changes unless your doctor suggests them.   Go to all your doctor visits.  Be sure to have all your required lab tests. This way, your medicines can be refilled on time.  Do not use any drugs not prescribed by your doctor.  Avoid alcohol.

## 2019-07-03 NOTE — PLAN OF CARE
With initial task set up, pt participated in therapeutic group activity and discussion addressing balanced scheduling. Educated pt on four categories of activity (self-care, leisure, productivity, and social) and the importance of balance between categories for wellness with pt verbalizing understanding. Pt created pie chart of current breakdown of daily schedule using the four categories and identified where he is out of balance. Pt reports fair balance in his daily schedule, however, reports that he would like to be spending more time participating in enjoyable hobbies versus watching TV in his leisure time. With MIN A, problem-solved strategies to address same (return to woodworking, etc.). Pt looking forward to discharge later today.

## 2019-07-03 NOTE — PLAN OF CARE
Pt presented with a full range affect and calm mood. Thought process is impaired. Behavior is appropriate. Insight is poor. Pt spent most of his in the lounge watching TV with peers. Denied SI, ate his meal, attended groups, and was med compliant.

## 2019-07-03 NOTE — PROGRESS NOTES
[facility] Psychiatric Progress Note       Interim History     The patient's care was discussed with the treatment team and chart notes were reviewed. Patient was deemed to be very respectful and pleasant over the weekend. Patient had expressed suicidal ideation throughout the weekend, however was able to contract for safety. He reported to staff on multiple occassions that he wants to get better and would like to do more than ECT to do so. Pt seen on 7/03/19 by Dr. Hill. Patient underwent his third ECT session this morning. The patient has been responding well to his inpatient ECT sessions. Patient's wife was contacted, she is in agreement for the patient to return home and continues ECT's 4, 5, and 6 as an outpatient. Will plan on discharging the patient today. No medication changes.      Hospital Course   This is a 59 year old  male with a longstanding history of major depression and borderline personality disorder. He has multiple inpatient hospitalizations, ECT psychiatric treatment, along with numerous psychiatric medication trials. He has followed Dr. Hill as an outpatient at [facility] for the past few years. He was presented here to [facility] on 6/27 as a direct admit from Dr. Hill's office due to patient experencing an increase in symptoms of depression. At the outpatient office, the patient presented very hopeless along with feeling overwhelmed emotionally. He had been experiencing life stressors such as work and caring for his daughter with Asperger's. Dr. Hill deemed the patient to be appropriate for ECT treatment since the patient had responded well to this form of psychiatric treatment in the past. He received his first session of ECT on 6/28/19 and his second ECT session on 7/01/19. Both procedures went without complication. Patient underwent his third ECT session on 7/3/19. The patient has been responding well to his inpatient ECT sessions.  Patient's wife was contacted, she is in agreement for the patient to return home and continues ECT's 4, 5, and 6 as an outpatient. Will plan on discharging the patient today. No medication changes.        Medications     Current Facility-Administered Medications Ordered in Epic   Medication Dose Route Frequency Last Rate Last Dose     eszopiclone (LUNESTA) tablet 3 mg  3 mg Oral At Bedtime   3 mg at 07/02/19 2234     fenofibrate (TRIGLIDE/LOFIBRA) tablet 160 mg  160 mg Oral Daily with breakfast   160 mg at 07/02/19 0803     losartan (COZAAR) tablet 50 mg  50 mg Oral Daily   50 mg at 07/01/19 0742    And     hydrochlorothiazide (MICROZIDE) capsule 12.5 mg  12.5 mg Oral Daily   12.5 mg at 07/01/19 0742     hydrOXYzine (ATARAX) tablet 25 mg  25 mg Oral Q4H PRN         ibuprofen (ADVIL/MOTRIN) tablet 800 mg  800 mg Oral BID PRN   800 mg at 07/02/19 0811     lactated ringers infusion  500 mL Intravenous Continuous 25 mL/hr at 07/03/19 0614 500 mL at 07/03/19 0614     lidocaine 1 % 0.1-1 mL  0.1-1 mL Other Q1H PRN   0.2 mL at 07/03/19 0614     lidocaine patch in PLACE   Transdermal Q8H         oxymetazoline (AFRIN) 0.05 % spray 2 spray  2 spray Both Nostrils At Bedtime   2 spray at 07/02/19 2233     sodium chloride (PF) 0.9% PF flush 3 mL  3 mL Intracatheter q1 min prn         vitamin D3 (CHOLECALCIFEROL) 2000 units (50 mcg) tablet 2,000 Units  2,000 Units Oral Daily   2,000 Units at 07/02/19 0803     vortioxetine (TRINTELLIX/BRINTELLIX) tablet 20 mg  20 mg Oral Daily   20 mg at 07/02/19 0803     No current [facility]-ordered outpatient medications on file.         Allergies      Allergies   Allergen Reactions     Nka [No Known Allergies]         Medical Review of Systems     /80   Pulse 63   Temp 97.2  F (36.2  C) (Temporal)   Resp 18   Ht 1.829 m (6')   Wt 103.6 kg (228 lb 4.8 oz)   SpO2 95%   BMI 30.96 kg/m    Body mass index is 30.96 kg/m .  A 10-point review of systems was performed by Jose Hill MD  and is negative, no new findings.      Psychiatric Examination     Appearance Sitting in chair, dressed in normal clothing. Appears stated age.   Attitude Cooperative   Orientation Oriented to person, place, time   Eye Contact Good   Speech Regular rate, rhythm, volume and tone   Language Normal   Psychomotor Behavior Normal   Mood Less depressed   Affect Very Pleasant   Thought Process Goal-Oriented, Intact   Associations Intact   Thought Content Patient is currently negative for suicidal ideation, negative for plan or intent, able to contract no self harm and identify barriers to suicide.  Negative for obsessions, compulsions or psychosis.     Fund of Knowledge Intact   Insight Somewhat poor at times   Judgement Intact   Attention Span & Concentration Intact   Recent & Remote Memory Intact   Gait Normal   Muscle Tone Intact        Labs     Labs reviewed.  No results found for this or any previous visit (from the past 24 hour(s)).     Impression      This is a 59 year old  male with a longstanding history of major depression and borderline personality disorder. The patient was very pleasant and respectful over the weekend to both his peers and staff members. He heavily stressed a desire to get better and was able to acknowledge that ECT has been helpful in the past, however wants even more in regards of care. It was recorded that patient had suicidal ideation over the weekend. Patient underwent his second ECT session this morning, where there were no complications. He denied experiencing any side effects, such as nausea, headache, or confusion. He completed his third session on 7/03. Aside from ECT, patient denied any concerns in regards of his medication regimen or overall care. Dr. Hill made no adjustments to medications. Patient's wife was contacted, she is in agreement to have the patient finish ECT regiment as an outpatient.      Diagnoses     1. Major depression, recurrent, severe, without  psychotic features.  2. Borderline Personality Disorder      Plan     1. Explained side effects, benefits, and complications of medications to the patient, Pt gave verbal consent.  2. Medication changes: None   3. Discussed treatment plan with patient and team.  4. Projected length of stay: Discharged today  5. ECT #4 on 7/05/19 as outpatient.       Attestation:   Patient has been seen and evaluated by me, Jose Hill MD.    Patient ID:  Name: Bull Garza    MRN: [id number]  Admission: 6/27/2019   YOB: 1960

## 2019-07-03 NOTE — PROGRESS NOTES
met with patient to have him sign release of information forms for [facility] and the [facility]. Follow up appointments made for patient at both clinics.  went over the safety plan with patient and encouraged him to complete it. Patient denies any thoughts of suicide or self harm at this time.

## 2019-07-03 NOTE — PROGRESS NOTES
Discharge teaching done with pt and pt's wife Daily, Pt denied SI. Pt verbalized understanding of Medication and out pt f/u TX plan. No medication ordered as pt has med's at home. Belongings returned to pt. Pt will have 3 more ECT treatments as an out pt and all information given including no driving until one week after last ECT. Pt discharged to home.

## 2019-07-03 NOTE — DISCHARGE SUMMARY
[facility] Psychiatric Discharge Summary      DATE OF ADMISSION: 6/27/2019     DATE OF DISCHARGE: 7/3/2019    PRIMARY CARE PHYSICIAN: No Ref-Primary, Physician    IDENTIFICATION: For history, see dictation by Dr. Hill on 6/28/19. For physical summary, see dictation by STEPHANY Villarreal on 6/27/19.     HOSPITAL COURSE:     This is a 59 year old  male with a longstanding history of major depression and borderline personality disorder. He has multiple inpatient hospitalizations, ECT psychiatric treatment, along with numerous psychiatric medication trials. He has followed Dr. Hill as an outpatient at [facility] (formerly [facility]) for the past few years. He was presented here to [facility] on 6/27 as a direct admit from Dr. Hill's office due to patient experencing an increase in symptoms of depression. At the outpatient office, the patient presented very hopeless along with feeling overwhelmed emotionally. He had been experiencing life stressors such as work and caring for his daughter with Asperger's. Dr. Hill deemed the patient to be appropriate for ECT treatment since the patient had responded well to this form of psychiatric treatment in the past. He received his first session of ECT on 6/28/19 and his second ECT session on 7/01/19. Both procedures went without complication. Patient underwent his third ECT session on 7/3/19. The patient has been responding well to his inpatient ECT sessions. Patient's wife was contacted, she is in agreement for the patient to return home and continues ECT's 4, 5, and 6 as an outpatient. Will plan on discharging the patient today. No medication changes.        DISCHARGE MENTAL STATUS EXAMINATION:       Appearance Sitting in chair, dressed in normal clothing. Appears stated age.   Attitude Cooperative   Orientation Oriented to person, place, time   Eye Contact Good   Speech Regular rate, rhythm, volume and tone   Language Normal   Psychomotor Behavior  Normal   Mood Less depressed   Affect Very Pleasant   Thought Process Goal-Oriented, Intact   Associations Intact   Thought Content Patient is currently negative for suicidal ideation, negative for plan or intent, able to contract no self harm and identify barriers to suicide.  Negative for obsessions, compulsions or psychosis.     Fund of Knowledge Intact   Insight Somewhat poor at times   Judgement Intact   Attention Span & Concentration Intact   Recent & Remote Memory Intact   Gait Normal   Muscle Tone Intact          Labs         LABORATORY DATA:    Refer to hospitalist admission dictation.  No results found for this or any previous visit (from the past 24 hour(s)).     /80   Pulse 63   Temp 97.2  F (36.2  C) (Temporal)   Resp 18   Ht 1.829 m (6')   Wt 103.6 kg (228 lb 4.8 oz)   SpO2 95%   BMI 30.96 kg/m       DISCHARGE MEDICATIONS:      Review of your medicines      CONTINUE these medicines which have NOT CHANGED      Dose / Directions   AFRIN 12 HOUR 0.05 % nasal spray  Generic drug:  oxymetazoline      Dose:  2 spray  Spray 2 sprays into both nostrils At Bedtime  Refills:  0     eszopiclone 3 MG tablet  Commonly known as:  LUNESTA      Dose:  3 mg  Take 3 mg by mouth At Bedtime  Refills:  0     fenofibrate 160 MG tablet  Commonly known as:  TRIGLIDE  Used for:  Dyslipidemia      Dose:  160 mg  Take 1 tablet (160 mg) by mouth daily with food.  Quantity:  90 tablet  Refills:  1     ibuprofen 200 MG tablet  Commonly known as:  ADVIL/MOTRIN      Dose:  800 mg  Take 800 mg by mouth every morning  Refills:  0     losartan-hydrochlorothiazide 50-12.5 MG tablet  Commonly known as:  HYZAAR      Dose:  1 tablet  Take 1 tablet by mouth daily  Refills:  0     vitamin D3 2000 units (50 mcg) tablet  Commonly known as:  CHOLECALCIFEROL      Dose:  1 tablet  Take 1 tablet by mouth daily  Refills:  0     vortioxetine 20 MG tablet  Commonly known as:  TRINTELLIX/BRINTELLIX  Used for:  Severe recurrent major  depression without psychotic features (H)      Dose:  20 mg  Take 1 tablet (20 mg) by mouth daily  Quantity:  30 tablet  Refills:  0            DISCHARGE DIAGNOSES:    1. Major depression, recurrent, severe, without psychotic features.  2. Borderline Personality Disorder     DISCHARGE PLAN:     1.  Continue ECT sessions 4, 5, and 6 as an outpatient. Patient will return home to his wife.   2.  Continue the following medications: Lunesta 3mg qhs and Trintellix 20mg qam.    DISCHARGE FOLLOW-UP:    Patient will follow up with Dr. Hill at [facility] in one month.     Attestation:   Patient has been seen and evaluated by me, Jose Hill MD.    Patient ID:    Name: Bull Garza MRN: [id number]  Admission: 6/27/2019  YOB: 1960

## 2019-07-03 NOTE — PROGRESS NOTES
" 7/02/19 1300   Art Therapy   Type of Intervention structured groups   Response participates with encouragement   Hours 1   Treatment Detail Art Therapy- safe place/ landscape of emotions     Problem-1.  Major depression, recurrent, severe.   2.  Borderline personality disorder.      Goal- cope, express, regulate emotions     Outcome- pt attended Art Therapy group. Pt enjoyed group. He said it was fun to draw. He did a thoughtful  Drawing, he said it was about \" life is dangerous.\" It was a big monster, very well drawn, eating a bridge. Writer asked him if any part of it wasn't dangerous, and he said \" there a bird in the tree.\" He reported mood at beginning of group 5 of 10 and 7 of 10 at end of group.     "

## 2019-07-03 NOTE — ANESTHESIA CARE TRANSFER NOTE
Patient: Bull Garza    * No procedures listed *    Diagnosis: * No pre-op diagnosis entered *  Diagnosis Additional Information: No value filed.    Anesthesia Type:   General     Note:  Airway :Nasal Cannula  Patient transferred to:PACU  Comments: Native airway general anesthetic.  Patient hyperventilated with 100% oxygen via mask prior to treatment.   Anesthesia induced using patent peripheral IV.    Bite block placed between molars to protect teeth and tongue.     After induction of seizure patient mask ventilated with 100% oxygen until spontaneous respirations returned.     At time of handoff to PACU, patient exhibited spontaneous respirations, adequate tidal volumes, airway patent. Oxygen via nasal cannula at 4 liters per minute to PACU in cart with siderails up, connected to wall O2 in PACU. All monitors and alarms on and functioning. Report given to PACU RN and questions answered. Handoff Report: Identifed the Patient, Identified the Reponsible Provider, Reviewed the pertinent medical history, Discussed the surgical course, Reviewed Intra-OP anesthesia mangement and issues during anesthesia, Set expectations for post-procedure period and Allowed opportunity for questions and acknowledgement of understanding      Vitals: (Last set prior to Anesthesia Care Transfer)    CRNA VITALS  7/3/2019 0624 - 7/3/2019 0654      7/3/2019             Pulse:  80    SpO2:  100 %    Resp Rate (set):  10                Electronically Signed By: TRISHA Klein CRNA  July 3, 2019  6:54 AM

## 2019-07-03 NOTE — ANESTHESIA POSTPROCEDURE EVALUATION
Patient: Bull Garza    * No procedures listed *    Diagnosis:* No pre-op diagnosis entered *  Diagnosis Additional Information: No value filed.    Anesthesia Type:  General    Note:  Anesthesia Post Evaluation    Patient location during evaluation: PACU  Patient participation: Able to fully participate in evaluation  Level of consciousness: awake and alert  Pain management: adequate  Airway patency: patent  Cardiovascular status: acceptable and stable  Respiratory status: acceptable, spontaneous ventilation, unassisted and nonlabored ventilation  Hydration status: acceptable  PONV: none             Last vitals:  Vitals:    07/03/19 0547 07/03/19 0615 07/03/19 0653   BP: 144/67 140/58 172/85   Pulse: 56     Resp: 16 14 16   Temp: 36.8  C (98.2  F) 36.2  C (97.1  F) 36.2  C (97.2  F)   SpO2: 95% 96% 94%         Electronically Signed By: Chalo Montague MD  July 3, 2019  7:07 AM

## 2019-07-05 ENCOUNTER — ANESTHESIA (OUTPATIENT)
Dept: SURGERY | Facility: CLINIC | Age: 59
End: 2019-07-05

## 2019-07-05 ENCOUNTER — HOSPITAL ENCOUNTER (OUTPATIENT)
Dept: SURGERY | Facility: CLINIC | Age: 59
Discharge: HOME | End: 2019-07-05
Attending: PSYCHIATRY & NEUROLOGY | Admitting: PSYCHIATRY & NEUROLOGY
Payer: COMMERCIAL

## 2019-07-05 ENCOUNTER — ANESTHESIA EVENT (OUTPATIENT)
Dept: SURGERY | Facility: CLINIC | Age: 59
End: 2019-07-05

## 2019-07-05 VITALS
DIASTOLIC BLOOD PRESSURE: 60 MMHG | RESPIRATION RATE: 20 BRPM | HEART RATE: 81 BPM | SYSTOLIC BLOOD PRESSURE: 110 MMHG | OXYGEN SATURATION: 95 % | TEMPERATURE: 97.9 F

## 2019-07-05 DIAGNOSIS — F33.2 SEVERE RECURRENT MAJOR DEPRESSION WITHOUT PSYCHOTIC FEATURES (H): Primary | ICD-10-CM

## 2019-07-05 PROCEDURE — 40000671 ZZH STATISTIC ANESTHESIA CASE

## 2019-07-05 PROCEDURE — 25000128 H RX IP 250 OP 636: Performed by: NURSE ANESTHETIST, CERTIFIED REGISTERED

## 2019-07-05 PROCEDURE — 90870 ELECTROCONVULSIVE THERAPY: CPT

## 2019-07-05 PROCEDURE — 25000125 ZZHC RX 250: Performed by: NURSE ANESTHETIST, CERTIFIED REGISTERED

## 2019-07-05 PROCEDURE — 25800030 ZZH RX IP 258 OP 636: Performed by: ANESTHESIOLOGY

## 2019-07-05 PROCEDURE — 37000008 ZZH ANESTHESIA TECHNICAL FEE, 1ST 30 MIN

## 2019-07-05 PROCEDURE — 40000010 ZZH STATISTIC ANES STAT CODE-CRNA PER MINUTE

## 2019-07-05 RX ORDER — OXYCODONE HYDROCHLORIDE 5 MG/1
5 TABLET ORAL
Status: DISCONTINUED | OUTPATIENT
Start: 2019-07-05 | End: 2019-07-06 | Stop reason: HOSPADM

## 2019-07-05 RX ORDER — SODIUM CHLORIDE, SODIUM LACTATE, POTASSIUM CHLORIDE, CALCIUM CHLORIDE 600; 310; 30; 20 MG/100ML; MG/100ML; MG/100ML; MG/100ML
INJECTION, SOLUTION INTRAVENOUS CONTINUOUS
Status: DISCONTINUED | OUTPATIENT
Start: 2019-07-05 | End: 2019-07-06 | Stop reason: HOSPADM

## 2019-07-05 RX ORDER — NALOXONE HYDROCHLORIDE 0.4 MG/ML
.1-.4 INJECTION, SOLUTION INTRAMUSCULAR; INTRAVENOUS; SUBCUTANEOUS
Status: DISCONTINUED | OUTPATIENT
Start: 2019-07-05 | End: 2019-07-06 | Stop reason: HOSPADM

## 2019-07-05 RX ORDER — ONDANSETRON 4 MG/1
4 TABLET, ORALLY DISINTEGRATING ORAL EVERY 30 MIN PRN
Status: DISCONTINUED | OUTPATIENT
Start: 2019-07-05 | End: 2019-07-06 | Stop reason: HOSPADM

## 2019-07-05 RX ORDER — HYDROMORPHONE HYDROCHLORIDE 1 MG/ML
.3-.5 INJECTION, SOLUTION INTRAMUSCULAR; INTRAVENOUS; SUBCUTANEOUS EVERY 10 MIN PRN
Status: DISCONTINUED | OUTPATIENT
Start: 2019-07-05 | End: 2019-07-06 | Stop reason: HOSPADM

## 2019-07-05 RX ORDER — SODIUM CHLORIDE, SODIUM LACTATE, POTASSIUM CHLORIDE, CALCIUM CHLORIDE 600; 310; 30; 20 MG/100ML; MG/100ML; MG/100ML; MG/100ML
500 INJECTION, SOLUTION INTRAVENOUS CONTINUOUS
Status: DISCONTINUED | OUTPATIENT
Start: 2019-07-05 | End: 2019-07-06 | Stop reason: HOSPADM

## 2019-07-05 RX ORDER — ONDANSETRON 2 MG/ML
4 INJECTION INTRAMUSCULAR; INTRAVENOUS EVERY 30 MIN PRN
Status: DISCONTINUED | OUTPATIENT
Start: 2019-07-05 | End: 2019-07-06 | Stop reason: HOSPADM

## 2019-07-05 RX ORDER — FENTANYL CITRATE 50 UG/ML
25-50 INJECTION, SOLUTION INTRAMUSCULAR; INTRAVENOUS EVERY 5 MIN PRN
Status: DISCONTINUED | OUTPATIENT
Start: 2019-07-05 | End: 2019-07-06 | Stop reason: HOSPADM

## 2019-07-05 RX ORDER — LABETALOL HYDROCHLORIDE 5 MG/ML
INJECTION, SOLUTION INTRAVENOUS PRN
Status: DISCONTINUED | OUTPATIENT
Start: 2019-07-05 | End: 2019-07-05

## 2019-07-05 RX ADMIN — SODIUM CHLORIDE, POTASSIUM CHLORIDE, SODIUM LACTATE AND CALCIUM CHLORIDE 500 ML: 600; 310; 30; 20 INJECTION, SOLUTION INTRAVENOUS at 06:32

## 2019-07-05 RX ADMIN — LABETALOL HYDROCHLORIDE 10 MG: 5 INJECTION INTRAVENOUS at 07:05

## 2019-07-05 RX ADMIN — SUCCINYLCHOLINE CHLORIDE 100 MG: 20 INJECTION, SOLUTION INTRAMUSCULAR; INTRAVENOUS; PARENTERAL at 06:54

## 2019-07-05 RX ADMIN — METHOHEXITAL SODIUM 100 MG: 500 INJECTION, POWDER, LYOPHILIZED, FOR SOLUTION INTRAMUSCULAR; INTRAVENOUS; RECTAL at 06:54

## 2019-07-05 RX ADMIN — SUCCINYLCHOLINE CHLORIDE 20 MG: 20 INJECTION, SOLUTION INTRAMUSCULAR; INTRAVENOUS; PARENTERAL at 06:55

## 2019-07-05 ASSESSMENT — LIFESTYLE VARIABLES: TOBACCO_USE: 0

## 2019-07-05 ASSESSMENT — ENCOUNTER SYMPTOMS: SEIZURES: 0
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
## 2019-07-05 NOTE — PROCEDURES
[facility] ECT Procedure Note     Bull Garza [id number]   59 year old 1960     Patient Status: Outpatient    Allergies   Allergen Reactions     Nka [No Known Allergies]        Weight:  0 lbs 0 oz              Diagnosis:   Major depression       Indications for ECT:   Medications ineffective       Pause for the Cause:     Right patient Yes   Right procedure/laterality settings: Yes   Right diagnosis Yes          Intra-Procedure Documentation:     Date:  7/5/2019  Time:  6:22 AM    ECT #    Treatment number this series: 4   Total treatment number: 4   Type of ECT:  Bilateral, standard    ECT Medications administered: See MAR and Anesthesia record         Clinical Narrative:     ECT was administered by Thymatron machine.  Pt has been medically cleared for procedure, consent signed. Side effects, Risks and benefits reviewed.    ECT Strip Summary:   Energy Level: 80 percent  Motor Seizure Duration: 30 seconds  EEG Seizure Duration: 30 seconds    Complications: No    Plan: 5rh ECT 7/8/19  Outpatient Psychiatrist: Dr Hill

## 2019-07-05 NOTE — ANESTHESIA CARE TRANSFER NOTE
Patient: Bull Garza    * No procedures listed *    Diagnosis: * No pre-op diagnosis entered *  Diagnosis Additional Information: No value filed.    Anesthesia Type:   General     Note:  Airway :Face Mask  Patient transferred to:PACU  Comments: Neuromuscular blockade not used after succinylcholine for intubation, spontaneous return of TOF 4/4 with sustained tetany, spontaneous respirations, adequate tidal volumes, followed commands to voice.  Oxygen via facemask at 10 liters per minute to PACU. Oxygen tubing connected to wall O2 in PACU, SpO2, NiBP, and EKG monitors and alarms on and functioning, report on patient's clinical status given to PACU RN, RN questions answered. Handoff Report: Identifed the Patient, Identified the Reponsible Provider, Reviewed the pertinent medical history, Discussed the surgical course, Reviewed Intra-OP anesthesia mangement and issues during anesthesia, Set expectations for post-procedure period and Allowed opportunity for questions and acknowledgement of understanding      Vitals: (Last set prior to Anesthesia Care Transfer)    CRNA VITALS  7/5/2019 0638 - 7/5/2019 0709      7/5/2019             Pulse:  74    SpO2:  94 %    Resp Rate (set):  10                Electronically Signed By: TRISHA Burch CRNA  July 5, 2019  7:09 AM

## 2019-07-05 NOTE — ANESTHESIA POSTPROCEDURE EVALUATION
Patient: Bull Garza    * No procedures listed *    Diagnosis:* No pre-op diagnosis entered *  Diagnosis Additional Information: No value filed.    Anesthesia Type:  General    Note:  Anesthesia Post Evaluation    Patient location during evaluation: PACU  Patient participation: Able to fully participate in evaluation  Level of consciousness: awake  Pain management: adequate  Airway patency: patent  Cardiovascular status: acceptable  Respiratory status: acceptable  Hydration status: acceptable  PONV: none     Anesthetic complications: None          Last vitals:  Vitals:    07/05/19 0720 07/05/19 0730 07/05/19 0740   BP: 141/76 112/59 110/60   Pulse: 80 81    Resp: 26 11 20   Temp:      SpO2: 96% 95% 95%         Electronically Signed By: Umberto Milan MD  July 5, 2019  7:59 AM

## 2019-07-08 ENCOUNTER — ANESTHESIA EVENT (OUTPATIENT)
Dept: SURGERY | Facility: CLINIC | Age: 59
End: 2019-07-08

## 2019-07-08 ENCOUNTER — HOSPITAL ENCOUNTER (OUTPATIENT)
Dept: SURGERY | Facility: CLINIC | Age: 59
Discharge: HOME | End: 2019-07-08
Attending: PSYCHIATRY & NEUROLOGY | Admitting: PSYCHIATRY & NEUROLOGY
Payer: COMMERCIAL

## 2019-07-08 ENCOUNTER — ANESTHESIA (OUTPATIENT)
Dept: SURGERY | Facility: CLINIC | Age: 59
End: 2019-07-08

## 2019-07-08 VITALS
OXYGEN SATURATION: 94 % | HEART RATE: 67 BPM | TEMPERATURE: 97.9 F | DIASTOLIC BLOOD PRESSURE: 67 MMHG | RESPIRATION RATE: 14 BRPM | SYSTOLIC BLOOD PRESSURE: 125 MMHG

## 2019-07-08 DIAGNOSIS — F33.2 SEVERE RECURRENT MAJOR DEPRESSION WITHOUT PSYCHOTIC FEATURES (H): Primary | ICD-10-CM

## 2019-07-08 PROCEDURE — 25000128 H RX IP 250 OP 636: Performed by: NURSE ANESTHETIST, CERTIFIED REGISTERED

## 2019-07-08 PROCEDURE — 25800030 ZZH RX IP 258 OP 636: Performed by: ANESTHESIOLOGY

## 2019-07-08 PROCEDURE — 25000128 H RX IP 250 OP 636: Performed by: PSYCHIATRY & NEUROLOGY

## 2019-07-08 PROCEDURE — 37000008 ZZH ANESTHESIA TECHNICAL FEE, 1ST 30 MIN

## 2019-07-08 PROCEDURE — 25000125 ZZHC RX 250: Performed by: NURSE ANESTHETIST, CERTIFIED REGISTERED

## 2019-07-08 PROCEDURE — 90870 ELECTROCONVULSIVE THERAPY: CPT

## 2019-07-08 PROCEDURE — 40000010 ZZH STATISTIC ANES STAT CODE-CRNA PER MINUTE

## 2019-07-08 RX ORDER — SODIUM CHLORIDE, SODIUM LACTATE, POTASSIUM CHLORIDE, CALCIUM CHLORIDE 600; 310; 30; 20 MG/100ML; MG/100ML; MG/100ML; MG/100ML
500 INJECTION, SOLUTION INTRAVENOUS CONTINUOUS
Status: DISCONTINUED | OUTPATIENT
Start: 2019-07-08 | End: 2019-07-09 | Stop reason: HOSPADM

## 2019-07-08 RX ORDER — KETOROLAC TROMETHAMINE 30 MG/ML
30 INJECTION, SOLUTION INTRAMUSCULAR; INTRAVENOUS ONCE
Status: COMPLETED | OUTPATIENT
Start: 2019-07-08 | End: 2019-07-08

## 2019-07-08 RX ADMIN — SUCCINYLCHOLINE CHLORIDE 100 MG: 20 INJECTION, SOLUTION INTRAMUSCULAR; INTRAVENOUS; PARENTERAL at 06:41

## 2019-07-08 RX ADMIN — METHOHEXITAL SODIUM 100 MG: 500 INJECTION, POWDER, LYOPHILIZED, FOR SOLUTION INTRAMUSCULAR; INTRAVENOUS; RECTAL at 06:41

## 2019-07-08 RX ADMIN — SODIUM CHLORIDE, POTASSIUM CHLORIDE, SODIUM LACTATE AND CALCIUM CHLORIDE 500 ML: 600; 310; 30; 20 INJECTION, SOLUTION INTRAVENOUS at 06:25

## 2019-07-08 RX ADMIN — KETOROLAC TROMETHAMINE 30 MG: 30 INJECTION, SOLUTION INTRAMUSCULAR at 06:25

## 2019-07-08 NOTE — ANESTHESIA POSTPROCEDURE EVALUATION
Patient: Bull Garza    * No procedures listed *    Diagnosis:* No pre-op diagnosis entered *  Diagnosis Additional Information: No value filed.    Anesthesia Type:  General    Note:  Anesthesia Post Evaluation    Patient location during evaluation: PACU  Patient participation: Able to fully participate in evaluation  Level of consciousness: awake  Pain management: adequate  Airway patency: patent  Cardiovascular status: acceptable  Respiratory status: acceptable  Hydration status: acceptable  PONV: controlled     Anesthetic complications: None          Last vitals:  Vitals:    07/08/19 0715 07/08/19 0720 07/08/19 0725   BP: 117/62 125/67    Pulse: 70 67    Resp: 16 10 14   Temp:      SpO2: 98% 96% 94%         Electronically Signed By: León Tavarez MD  July 8, 2019  10:37 AM
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
## 2019-07-10 ENCOUNTER — ANESTHESIA (OUTPATIENT)
Dept: SURGERY | Facility: CLINIC | Age: 59
End: 2019-07-10

## 2019-07-10 ENCOUNTER — ANESTHESIA EVENT (OUTPATIENT)
Dept: SURGERY | Facility: CLINIC | Age: 59
End: 2019-07-10

## 2019-07-10 ENCOUNTER — HOSPITAL ENCOUNTER (OUTPATIENT)
Dept: SURGERY | Facility: CLINIC | Age: 59
Discharge: HOME | End: 2019-07-10
Attending: PSYCHIATRY & NEUROLOGY | Admitting: PSYCHIATRY & NEUROLOGY
Payer: COMMERCIAL

## 2019-07-10 VITALS
RESPIRATION RATE: 8 BRPM | HEART RATE: 64 BPM | OXYGEN SATURATION: 98 % | DIASTOLIC BLOOD PRESSURE: 85 MMHG | SYSTOLIC BLOOD PRESSURE: 109 MMHG | TEMPERATURE: 98.7 F

## 2019-07-10 DIAGNOSIS — F33.2 SEVERE RECURRENT MAJOR DEPRESSION WITHOUT PSYCHOTIC FEATURES (H): Primary | ICD-10-CM

## 2019-07-10 PROCEDURE — 90870 ELECTROCONVULSIVE THERAPY: CPT

## 2019-07-10 PROCEDURE — 25000128 H RX IP 250 OP 636: Performed by: NURSE ANESTHETIST, CERTIFIED REGISTERED

## 2019-07-10 PROCEDURE — 25000125 ZZHC RX 250: Performed by: NURSE ANESTHETIST, CERTIFIED REGISTERED

## 2019-07-10 PROCEDURE — 37000008 ZZH ANESTHESIA TECHNICAL FEE, 1ST 30 MIN

## 2019-07-10 PROCEDURE — 25800030 ZZH RX IP 258 OP 636: Performed by: ANESTHESIOLOGY

## 2019-07-10 PROCEDURE — 40000010 ZZH STATISTIC ANES STAT CODE-CRNA PER MINUTE

## 2019-07-10 PROCEDURE — 25000128 H RX IP 250 OP 636: Performed by: PSYCHIATRY & NEUROLOGY

## 2019-07-10 RX ORDER — KETOROLAC TROMETHAMINE 30 MG/ML
30 INJECTION, SOLUTION INTRAMUSCULAR; INTRAVENOUS
Status: DISCONTINUED | OUTPATIENT
Start: 2019-07-10 | End: 2019-07-11 | Stop reason: HOSPADM

## 2019-07-10 RX ORDER — SODIUM CHLORIDE, SODIUM LACTATE, POTASSIUM CHLORIDE, CALCIUM CHLORIDE 600; 310; 30; 20 MG/100ML; MG/100ML; MG/100ML; MG/100ML
500 INJECTION, SOLUTION INTRAVENOUS CONTINUOUS
Status: DISCONTINUED | OUTPATIENT
Start: 2019-07-10 | End: 2019-07-11 | Stop reason: HOSPADM

## 2019-07-10 RX ORDER — KETOROLAC TROMETHAMINE 30 MG/ML
30 INJECTION, SOLUTION INTRAMUSCULAR; INTRAVENOUS
Status: CANCELLED
Start: 2019-07-10

## 2019-07-10 RX ADMIN — KETOROLAC TROMETHAMINE 30 MG: 30 INJECTION, SOLUTION INTRAMUSCULAR at 06:25

## 2019-07-10 RX ADMIN — SUCCINYLCHOLINE CHLORIDE 100 MG: 20 INJECTION, SOLUTION INTRAMUSCULAR; INTRAVENOUS; PARENTERAL at 06:44

## 2019-07-10 RX ADMIN — SODIUM CHLORIDE, POTASSIUM CHLORIDE, SODIUM LACTATE AND CALCIUM CHLORIDE 500 ML: 600; 310; 30; 20 INJECTION, SOLUTION INTRAVENOUS at 06:14

## 2019-07-10 RX ADMIN — METHOHEXITAL SODIUM 100 MG: 500 INJECTION, POWDER, LYOPHILIZED, FOR SOLUTION INTRAMUSCULAR; INTRAVENOUS; RECTAL at 06:44

## 2019-07-10 NOTE — ANESTHESIA POSTPROCEDURE EVALUATION
Patient: Bull Garza    * No procedures listed *    Diagnosis:* No pre-op diagnosis entered *  Diagnosis Additional Information: No value filed.    Anesthesia Type:  General    Note:  Anesthesia Post Evaluation    Patient location during evaluation: bedside  Patient participation: Able to fully participate in evaluation  Level of consciousness: awake and alert  Pain management: adequate  Airway patency: patent  Cardiovascular status: acceptable  Respiratory status: acceptable  Hydration status: acceptable  PONV: none and controlled     Anesthetic complications: None          Last vitals:  Vitals:    07/10/19 0700 07/10/19 0705 07/10/19 0710   BP: 161/90 145/81 142/84   Pulse: 76 77 67   Resp: 10 8 12   Temp:      SpO2: 92% 98% 98%         Electronically Signed By: Armand Hobson MD  July 10, 2019  7:18 AM

## 2019-07-10 NOTE — PROCEDURES
[facility] ECT Procedure Note     Bull Garza [id number]   59 year old 1960     Patient Status: Outpatient    Allergies   Allergen Reactions     Nka [No Known Allergies]        Weight:  0 lbs 0 oz              Diagnosis:   Major depression       Indications for ECT:   Medications ineffective       Pause for the Cause:     Right patient Yes   Right procedure/laterality settings: Yes   Right diagnosis Yes          Intra-Procedure Documentation:     Date:  7/10/2019  Time:  6:22 AM    ECT #    Treatment number this series: 6   Total treatment number: 6   Type of ECT:  Bilateral, standard    ECT Medications administered: See MAR and Anesthesia record         Clinical Narrative:     ECT was administered by Thymatron machine.  Pt has been medically cleared for procedure, consent signed. Side effects, Risks and benefits reviewed.    ECT Strip Summary:   Energy Level: 100 percent  Motor Seizure Duration: 30 seconds  EEG Seizure Duration: 30 seconds    Complications: No    Plan: done  Outpatient Psychiatrist: Dr Hill
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
## 2020-11-24 ENCOUNTER — HOSPITAL ENCOUNTER (EMERGENCY)
Facility: CLINIC | Age: 60
Discharge: PSYCHIATRIC OR SUBSTANCE USE FACILITY | End: 2020-11-25
Attending: EMERGENCY MEDICINE | Admitting: EMERGENCY MEDICINE
Payer: COMMERCIAL

## 2020-11-24 ENCOUNTER — TELEPHONE (OUTPATIENT)
Dept: BEHAVIORAL HEALTH | Facility: CLINIC | Age: 60
End: 2020-11-24

## 2020-11-24 DIAGNOSIS — F32.A DEPRESSION, UNSPECIFIED DEPRESSION TYPE: ICD-10-CM

## 2020-11-24 LAB
AMPHETAMINES UR QL SCN: NEGATIVE
BARBITURATES UR QL: NEGATIVE
BENZODIAZ UR QL: NEGATIVE
CANNABINOIDS UR QL SCN: NEGATIVE
COCAINE UR QL: NEGATIVE
OPIATES UR QL SCN: NEGATIVE
PCP UR QL SCN: NEGATIVE

## 2020-11-24 PROCEDURE — C9803 HOPD COVID-19 SPEC COLLECT: HCPCS

## 2020-11-24 PROCEDURE — 80307 DRUG TEST PRSMV CHEM ANLYZR: CPT | Performed by: EMERGENCY MEDICINE

## 2020-11-24 PROCEDURE — 99285 EMERGENCY DEPT VISIT HI MDM: CPT | Mod: 25

## 2020-11-24 PROCEDURE — U0003 INFECTIOUS AGENT DETECTION BY NUCLEIC ACID (DNA OR RNA); SEVERE ACUTE RESPIRATORY SYNDROME CORONAVIRUS 2 (SARS-COV-2) (CORONAVIRUS DISEASE [COVID-19]), AMPLIFIED PROBE TECHNIQUE, MAKING USE OF HIGH THROUGHPUT TECHNOLOGIES AS DESCRIBED BY CMS-2020-01-R: HCPCS | Performed by: EMERGENCY MEDICINE

## 2020-11-24 PROCEDURE — 90791 PSYCH DIAGNOSTIC EVALUATION: CPT

## 2020-11-25 ENCOUNTER — APPOINTMENT (OUTPATIENT)
Dept: GENERAL RADIOLOGY | Facility: CLINIC | Age: 60
End: 2020-11-25
Attending: PHYSICIAN ASSISTANT
Payer: COMMERCIAL

## 2020-11-25 ENCOUNTER — APPOINTMENT (OUTPATIENT)
Dept: CT IMAGING | Facility: CLINIC | Age: 60
End: 2020-11-25
Attending: PHYSICIAN ASSISTANT
Payer: COMMERCIAL

## 2020-11-25 ENCOUNTER — HOSPITAL ENCOUNTER (INPATIENT)
Facility: CLINIC | Age: 60
LOS: 2 days | Discharge: HOME | End: 2020-11-27
Attending: PSYCHIATRY & NEUROLOGY | Admitting: PSYCHIATRY & NEUROLOGY
Payer: COMMERCIAL

## 2020-11-25 VITALS
TEMPERATURE: 98.5 F | RESPIRATION RATE: 18 BRPM | SYSTOLIC BLOOD PRESSURE: 141 MMHG | HEIGHT: 72 IN | DIASTOLIC BLOOD PRESSURE: 73 MMHG | OXYGEN SATURATION: 99 % | BODY MASS INDEX: 30.96 KG/M2 | HEART RATE: 47 BPM

## 2020-11-25 DIAGNOSIS — R45.851 DEPRESSION WITH SUICIDAL IDEATION: ICD-10-CM

## 2020-11-25 DIAGNOSIS — G47.33 OSA (OBSTRUCTIVE SLEEP APNEA): Primary | ICD-10-CM

## 2020-11-25 DIAGNOSIS — I10 ESSENTIAL HYPERTENSION: ICD-10-CM

## 2020-11-25 DIAGNOSIS — R19.5 OCCULT BLOOD IN STOOLS: ICD-10-CM

## 2020-11-25 DIAGNOSIS — F32.A DEPRESSION WITH SUICIDAL IDEATION: ICD-10-CM

## 2020-11-25 DIAGNOSIS — T14.8XXA LOCAL INFECTION OF WOUND: ICD-10-CM

## 2020-11-25 DIAGNOSIS — M65.221 CALCIFIC TENDINITIS OF RIGHT UPPER ARM: ICD-10-CM

## 2020-11-25 DIAGNOSIS — L08.9 LOCAL INFECTION OF WOUND: ICD-10-CM

## 2020-11-25 DIAGNOSIS — F33.2 SEVERE RECURRENT MAJOR DEPRESSION WITHOUT PSYCHOTIC FEATURES (H): ICD-10-CM

## 2020-11-25 DIAGNOSIS — E61.1 IRON DEFICIENCY: ICD-10-CM

## 2020-11-25 DIAGNOSIS — K59.00 CONSTIPATION, UNSPECIFIED CONSTIPATION TYPE: ICD-10-CM

## 2020-11-25 LAB
ALBUMIN SERPL-MCNC: 3.6 G/DL (ref 3.4–5)
ALP SERPL-CCNC: 40 U/L (ref 40–150)
ALT SERPL W P-5'-P-CCNC: 20 U/L (ref 0–70)
ANION GAP SERPL CALCULATED.3IONS-SCNC: 7 MMOL/L (ref 3–14)
AST SERPL W P-5'-P-CCNC: 12 U/L (ref 0–45)
BILIRUB SERPL-MCNC: 0.8 MG/DL (ref 0.2–1.3)
BUN SERPL-MCNC: 15 MG/DL (ref 7–30)
CALCIUM SERPL-MCNC: 8.3 MG/DL (ref 8.5–10.1)
CHLORIDE SERPL-SCNC: 111 MMOL/L (ref 94–109)
CHOLEST SERPL-MCNC: 185 MG/DL
CO2 SERPL-SCNC: 25 MMOL/L (ref 20–32)
CREAT SERPL-MCNC: 0.72 MG/DL (ref 0.66–1.25)
DEPRECATED CALCIDIOL+CALCIFEROL SERPL-MC: 26 UG/L (ref 20–75)
ERYTHROCYTE [DISTWIDTH] IN BLOOD BY AUTOMATED COUNT: 15.1 % (ref 10–15)
GFR SERPL CREATININE-BSD FRML MDRD: >90 ML/MIN/{1.73_M2}
GLUCOSE SERPL-MCNC: 98 MG/DL (ref 70–99)
HCT VFR BLD AUTO: 28.4 % (ref 40–53)
HDLC SERPL-MCNC: 39 MG/DL
HGB BLD-MCNC: 8.6 G/DL (ref 13.3–17.7)
LABORATORY COMMENT REPORT: NORMAL
LDLC SERPL CALC-MCNC: 129 MG/DL
MCH RBC QN AUTO: 22.6 PG (ref 26.5–33)
MCHC RBC AUTO-ENTMCNC: 30.3 G/DL (ref 31.5–36.5)
MCV RBC AUTO: 75 FL (ref 78–100)
NONHDLC SERPL-MCNC: 146 MG/DL
PLATELET # BLD AUTO: 226 10E9/L (ref 150–450)
POTASSIUM SERPL-SCNC: 4 MMOL/L (ref 3.4–5.3)
PROT SERPL-MCNC: 6.3 G/DL (ref 6.8–8.8)
RBC # BLD AUTO: 3.8 10E12/L (ref 4.4–5.9)
SARS-COV-2 RNA SPEC QL NAA+PROBE: NEGATIVE
SARS-COV-2 RNA SPEC QL NAA+PROBE: NORMAL
SODIUM SERPL-SCNC: 143 MMOL/L (ref 133–144)
SPECIMEN SOURCE: NORMAL
SPECIMEN SOURCE: NORMAL
TRIGL SERPL-MCNC: 86 MG/DL
TSH SERPL DL<=0.005 MIU/L-ACNC: 1.52 MU/L (ref 0.4–4)
VIT B12 SERPL-MCNC: 428 PG/ML (ref 193–986)
WBC # BLD AUTO: 5.7 10E9/L (ref 4–11)

## 2020-11-25 PROCEDURE — 73030 X-RAY EXAM OF SHOULDER: CPT | Mod: 26 | Performed by: RADIOLOGY

## 2020-11-25 PROCEDURE — 250N000013 HC RX MED GY IP 250 OP 250 PS 637: Performed by: PSYCHIATRY & NEUROLOGY

## 2020-11-25 PROCEDURE — 124N000003 HC R&B MH SENIOR/ADOLESCENT

## 2020-11-25 PROCEDURE — 36415 COLL VENOUS BLD VENIPUNCTURE: CPT | Performed by: NURSE PRACTITIONER

## 2020-11-25 PROCEDURE — 90715 TDAP VACCINE 7 YRS/> IM: CPT | Performed by: PHYSICIAN ASSISTANT

## 2020-11-25 PROCEDURE — 250N000009 HC RX 250: Performed by: PHYSICIAN ASSISTANT

## 2020-11-25 PROCEDURE — 85027 COMPLETE CBC AUTOMATED: CPT | Performed by: NURSE PRACTITIONER

## 2020-11-25 PROCEDURE — 99222 1ST HOSP IP/OBS MODERATE 55: CPT | Performed by: PHYSICIAN ASSISTANT

## 2020-11-25 PROCEDURE — 90471 IMMUNIZATION ADMIN: CPT | Performed by: PHYSICIAN ASSISTANT

## 2020-11-25 PROCEDURE — 70450 CT HEAD/BRAIN W/O DYE: CPT

## 2020-11-25 PROCEDURE — 70450 CT HEAD/BRAIN W/O DYE: CPT | Mod: 26 | Performed by: RADIOLOGY

## 2020-11-25 PROCEDURE — 250N000013 HC RX MED GY IP 250 OP 250 PS 637: Performed by: NURSE PRACTITIONER

## 2020-11-25 PROCEDURE — H2032 ACTIVITY THERAPY, PER 15 MIN: HCPCS

## 2020-11-25 PROCEDURE — 73030 X-RAY EXAM OF SHOULDER: CPT | Mod: RT

## 2020-11-25 PROCEDURE — 82607 VITAMIN B-12: CPT | Performed by: NURSE PRACTITIONER

## 2020-11-25 PROCEDURE — 84443 ASSAY THYROID STIM HORMONE: CPT | Performed by: NURSE PRACTITIONER

## 2020-11-25 PROCEDURE — 250N000013 HC RX MED GY IP 250 OP 250 PS 637: Performed by: PHYSICIAN ASSISTANT

## 2020-11-25 PROCEDURE — 80053 COMPREHEN METABOLIC PANEL: CPT | Performed by: NURSE PRACTITIONER

## 2020-11-25 PROCEDURE — 250N000011 HC RX IP 250 OP 636: Performed by: PHYSICIAN ASSISTANT

## 2020-11-25 PROCEDURE — 250N000009 HC RX 250: Performed by: NURSE PRACTITIONER

## 2020-11-25 PROCEDURE — 80061 LIPID PANEL: CPT | Performed by: NURSE PRACTITIONER

## 2020-11-25 PROCEDURE — 99207 PR CONSULT E&M CHANGED TO INITIAL LEVEL: CPT | Performed by: PHYSICIAN ASSISTANT

## 2020-11-25 PROCEDURE — 82306 VITAMIN D 25 HYDROXY: CPT | Performed by: NURSE PRACTITIONER

## 2020-11-25 RX ORDER — MAGNESIUM HYDROXIDE/ALUMINUM HYDROXICE/SIMETHICONE 120; 1200; 1200 MG/30ML; MG/30ML; MG/30ML
30 SUSPENSION ORAL EVERY 4 HOURS PRN
Status: DISCONTINUED | OUTPATIENT
Start: 2020-11-25 | End: 2020-11-27 | Stop reason: HOSPADM

## 2020-11-25 RX ORDER — OXYMETAZOLINE HYDROCHLORIDE 0.05 G/100ML
2 SPRAY NASAL AT BEDTIME
Status: DISCONTINUED | OUTPATIENT
Start: 2020-11-25 | End: 2020-11-27 | Stop reason: HOSPADM

## 2020-11-25 RX ORDER — AMOXICILLIN 250 MG
1 CAPSULE ORAL 2 TIMES DAILY PRN
Status: DISCONTINUED | OUTPATIENT
Start: 2020-11-25 | End: 2020-11-27 | Stop reason: HOSPADM

## 2020-11-25 RX ORDER — ACETAMINOPHEN 325 MG/1
650 TABLET ORAL EVERY 4 HOURS PRN
Status: DISCONTINUED | OUTPATIENT
Start: 2020-11-25 | End: 2020-11-27 | Stop reason: HOSPADM

## 2020-11-25 RX ORDER — LOSARTAN POTASSIUM 25 MG/1
25 TABLET ORAL DAILY
Status: DISCONTINUED | OUTPATIENT
Start: 2020-11-25 | End: 2020-11-27 | Stop reason: HOSPADM

## 2020-11-25 RX ORDER — LANOLIN ALCOHOL/MO/W.PET/CERES
3 CREAM (GRAM) TOPICAL
Status: DISCONTINUED | OUTPATIENT
Start: 2020-11-25 | End: 2020-11-27 | Stop reason: HOSPADM

## 2020-11-25 RX ORDER — OLANZAPINE 10 MG/1
10 TABLET ORAL 3 TIMES DAILY PRN
Status: DISCONTINUED | OUTPATIENT
Start: 2020-11-25 | End: 2020-11-27 | Stop reason: HOSPADM

## 2020-11-25 RX ORDER — DESVENLAFAXINE 50 MG/1
50 TABLET, FILM COATED, EXTENDED RELEASE ORAL DAILY
Status: DISCONTINUED | OUTPATIENT
Start: 2020-11-25 | End: 2020-11-27 | Stop reason: HOSPADM

## 2020-11-25 RX ORDER — LORAZEPAM 1 MG/1
1-4 TABLET ORAL EVERY 30 MIN PRN
Status: DISCONTINUED | OUTPATIENT
Start: 2020-11-25 | End: 2020-11-27 | Stop reason: HOSPADM

## 2020-11-25 RX ORDER — FENOFIBRATE 160 MG/1
160 TABLET ORAL
Status: DISCONTINUED | OUTPATIENT
Start: 2020-11-25 | End: 2020-11-27 | Stop reason: HOSPADM

## 2020-11-25 RX ORDER — FERROUS GLUCONATE 324(38)MG
324 TABLET ORAL
Status: DISCONTINUED | OUTPATIENT
Start: 2020-11-26 | End: 2020-11-27 | Stop reason: HOSPADM

## 2020-11-25 RX ORDER — GINSENG 100 MG
CAPSULE ORAL 2 TIMES DAILY
Status: DISCONTINUED | OUTPATIENT
Start: 2020-11-25 | End: 2020-11-27 | Stop reason: HOSPADM

## 2020-11-25 RX ORDER — IBUPROFEN 200 MG
600 TABLET ORAL 3 TIMES DAILY PRN
Status: DISCONTINUED | OUTPATIENT
Start: 2020-11-25 | End: 2020-11-27 | Stop reason: HOSPADM

## 2020-11-25 RX ORDER — ASCORBIC ACID 250 MG
500 TABLET,CHEWABLE ORAL DAILY
Status: DISCONTINUED | OUTPATIENT
Start: 2020-11-25 | End: 2020-11-27 | Stop reason: HOSPADM

## 2020-11-25 RX ORDER — IBUPROFEN 200 MG
800 TABLET ORAL EVERY MORNING
Status: DISCONTINUED | OUTPATIENT
Start: 2020-11-25 | End: 2020-11-25

## 2020-11-25 RX ORDER — OLANZAPINE 10 MG/2ML
10 INJECTION, POWDER, FOR SOLUTION INTRAMUSCULAR 3 TIMES DAILY PRN
Status: DISCONTINUED | OUTPATIENT
Start: 2020-11-25 | End: 2020-11-27 | Stop reason: HOSPADM

## 2020-11-25 RX ORDER — LIOTHYRONINE SODIUM 25 UG/1
25 TABLET ORAL DAILY
Status: DISCONTINUED | OUTPATIENT
Start: 2020-11-26 | End: 2020-11-27 | Stop reason: HOSPADM

## 2020-11-25 RX ORDER — VITAMIN B COMPLEX
50 TABLET ORAL DAILY
Status: DISCONTINUED | OUTPATIENT
Start: 2020-11-25 | End: 2020-11-27 | Stop reason: HOSPADM

## 2020-11-25 RX ORDER — BUSPIRONE HYDROCHLORIDE 5 MG/1
5 TABLET ORAL 3 TIMES DAILY
Status: DISCONTINUED | OUTPATIENT
Start: 2020-11-25 | End: 2020-11-27

## 2020-11-25 RX ORDER — GABAPENTIN 100 MG/1
100 CAPSULE ORAL EVERY 6 HOURS PRN
Status: DISCONTINUED | OUTPATIENT
Start: 2020-11-25 | End: 2020-11-27 | Stop reason: HOSPADM

## 2020-11-25 RX ADMIN — Medication 50 MCG: at 08:08

## 2020-11-25 RX ADMIN — BACITRACIN ZINC: 500 OINTMENT TOPICAL at 12:38

## 2020-11-25 RX ADMIN — OXYMETAZOLINE HYDROCHLORIDE 2 SPRAY: 0.05 SPRAY NASAL at 21:07

## 2020-11-25 RX ADMIN — DESVENLAFAXINE SUCCINATE 50 MG: 50 TABLET, EXTENDED RELEASE ORAL at 12:38

## 2020-11-25 RX ADMIN — IBUPROFEN 800 MG: 200 TABLET, FILM COATED ORAL at 08:08

## 2020-11-25 RX ADMIN — FENOFIBRATE 160 MG: 160 TABLET ORAL at 08:08

## 2020-11-25 RX ADMIN — LOSARTAN POTASSIUM 25 MG: 25 TABLET, FILM COATED ORAL at 12:38

## 2020-11-25 RX ADMIN — CLOSTRIDIUM TETANI TOXOID ANTIGEN (FORMALDEHYDE INACTIVATED), CORYNEBACTERIUM DIPHTHERIAE TOXOID ANTIGEN (FORMALDEHYDE INACTIVATED), BORDETELLA PERTUSSIS TOXOID ANTIGEN (GLUTARALDEHYDE INACTIVATED), BORDETELLA PERTUSSIS FILAMENTOUS HEMAGGLUTININ ANTIGEN (FORMALDEHYDE INACTIVATED), BORDETELLA PERTUSSIS PERTACTIN ANTIGEN, AND BORDETELLA PERTUSSIS FIMBRIAE 2/3 ANTIGEN 0.5 ML: 5; 2; 2.5; 5; 3; 5 INJECTION, SUSPENSION INTRAMUSCULAR at 16:44

## 2020-11-25 RX ADMIN — BUSPIRONE HYDROCHLORIDE 5 MG: 5 TABLET ORAL at 14:42

## 2020-11-25 RX ADMIN — BACITRACIN ZINC: 500 OINTMENT TOPICAL at 21:08

## 2020-11-25 RX ADMIN — BUSPIRONE HYDROCHLORIDE 5 MG: 5 TABLET ORAL at 21:07

## 2020-11-25 RX ADMIN — Medication 500 MG: at 12:38

## 2020-11-25 ASSESSMENT — MIFFLIN-ST. JEOR: SCORE: 1798.28

## 2020-11-25 ASSESSMENT — ACTIVITIES OF DAILY LIVING (ADL)
LAUNDRY: UNABLE TO COMPLETE
DIFFICULTY_COMMUNICATING: NO
TOILETING_ISSUES: NO
NUMBER_OF_TIMES_PATIENT_HAS_FALLEN_WITHIN_LAST_SIX_MONTHS: 2
DRESS: INDEPENDENT
LAUNDRY: WITH SUPERVISION
DOING_ERRANDS_INDEPENDENTLY_DIFFICULTY: NO
DRESSING/BATHING_DIFFICULTY: NO
DIFFICULTY_EATING/SWALLOWING: NO
CONCENTRATING,_REMEMBERING_OR_MAKING_DECISIONS_DIFFICULTY: YES
ORAL_HYGIENE: INDEPENDENT
HYGIENE/GROOMING: INDEPENDENT
FALL_HISTORY_WITHIN_LAST_SIX_MONTHS: YES
ORAL_HYGIENE: INDEPENDENT
WEAR_GLASSES_OR_BLIND: NO
WALKING_OR_CLIMBING_STAIRS_DIFFICULTY: NO
HEARING_DIFFICULTY_OR_DEAF: NO
HYGIENE/GROOMING: INDEPENDENT
DRESS: INDEPENDENT

## 2020-11-25 NOTE — ED TRIAGE NOTES
Patient's family concerned about leaving him alone due to mental health crisis. Patient declined discussion in triage.

## 2020-11-25 NOTE — PLAN OF CARE
Problem: OT General Care Plan  Goal: OT Goal 1  Description: Will consistently attend OT groups and improve coping strategies with increasing repertoire of ideas and understanding of symptoms of when to use the strategies.     Note: Pt has not attended OT groups yet. They will be encouraged to attend groups and be provided a Self Assessment form.  OT staff will explain the value of OT, including them in their treatment plan and offer options to meet their needs and identify goals.

## 2020-11-25 NOTE — PROGRESS NOTES
11/25/20 0308   Patient Belongings   Did you bring any home meds/supplements to the hospital?  No   Patient Belongings locker;sent to security per site process   Patient Belongings Put in Hospital Secure Location (Security or Locker, etc.) cash/credit card;cell phone/electronics;clothing;plastic bag;shoes   Belongings Search Yes   Clothing Search Yes   Second Staff Yesi Moy. Assoc[facility] 3A   A               Admission:  Pt. Admitted on the night shift to room 366 bed two.  Locked in storage on the floor of 3B are the following items:  cell phone, shoes, belt, pants, and a shirt.  Pt.'s wallet, [place] license and credit cards were sent to the [facility] safe, envelope no., [phone].  No cash was brought into the hospital.  Seth Klasna Psy. Assoc., 11/25/2020.  I am responsible for any personal items that are not sent to the safe or pharmacy.  [facility] is not responsible for loss, theft or damage of any property in my possession.    Signature:  _________________________________ Date: _______  Time: _____                                              Staff Signature:  ____________________________ Date: ________  Time: _____      2nd Staff person, if patient is unable/unwilling to sign:    Signature: ________________________________ Date: ________  Time: _____     Discharge:  [facility] has returned all of my personal belongings:    Signature: _________________________________ Date: ________  Time: _____                                          Staff Signature:  ____________________________ Date: ________  Time: _____

## 2020-11-25 NOTE — PLAN OF CARE
Problem: General Plan of Care (Inpatient Behavioral)  Goal: Individualization/Patient Specific Goal (IP Behavioral)  Description: The patient and/or their representative will achieve their patient-specific goals related to the plan of care.    The patient-specific goals include:  Note: Reasons you are in the hospital:  1)  Suicidal ideation  2)  Crazy emotions  3) Depression  4)  Stressors - wife has metastatic breast cancer    Goals for Discharge:  1)  Stabilize my emotions  2)  Medication evaluation

## 2020-11-25 NOTE — SAFE
Bull GARRIDO Greg  November 24, 2020    The patient reports suicidal ideation and is unable to commit to safety.  Today he asked his doctor to prescribe a medicine that could assist in his suicide.  He has several past suicide attempts (last was two years ago.  He self-harmed last week.  He has not been taking his medicine as prescribed and has not been seeing his outpatient psychiatrist.  He will be admitted voluntarily for safety and stabilization.     Current Suicidal Ideation/Self-Injurious Concerns/Methods: Other No suicide plan reported but he is unable to commit to safety    Inappropriate Sexual Behavior: No    Aggression/Homicidal Ideation: None - N/A    For additional details see full DEC assessment.     Samara Antonio, LICSW

## 2020-11-25 NOTE — PLAN OF CARE
Problem: General Plan of Care (Inpatient Behavioral)  Goal: Team Discussion  Description: Team Plan:  Outcome: No Change  Note: BEHAVIORAL TEAM DISCUSSION    Participants: Dr. David Christianson MD, Suyapa Trna CTC, Alice Ramos OT, Lisa Lopez RN  Progress: New admit  Anticipated length of stay: 5-7 days  Continued Stay Criteria/Rationale: Suicidal ideation & SIB  Medical/Physical: No acute medical concerns  Precautions:   Behavioral Orders   Procedures    Code 1 - Restrict to Unit    Fall precautions    Routine Programming     As clinically indicated    Status 15     Every 15 minutes.    Suicide precautions     Patients on Suicide Precautions should have a Combination Diet ordered that includes a Diet selection(s) AND a Behavioral Tray selection for Safe Tray - with utensils, or Safe Tray - NO utensils      Withdrawal precautions     Plan: Psychiatric Assessment. Medication Management. Therapeutic Mileu. Individual and group support.   Rationale for change in precautions or plan: Initial plan

## 2020-11-25 NOTE — PLAN OF CARE
"Per pt's wife: \"The choices he has been making shows he doesn't want to die.\"    Pt has a superficial 0.5cm scratch on lower left leg, bacitracin applied, no redness noted.     Full range affect, mood is calm. Pt rates depression and anxiety at 5/10 (10 being the worst), denies SI, pt endorses thoughts of hurting himself with thoughts to hit himself, pt contracts for safety while on unit.     MSSA 1/1, pt eating and taking fluids.   "
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
## 2020-11-25 NOTE — ED NOTES
Patients wife Angela was updated of patients status and transfer.  All questions answered.  She wants RN to advise patient to call his employer, patient provided with phone to do so.
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
## 2020-11-25 NOTE — PHARMACY-ADMISSION MEDICATION HISTORY
Admission Medication History Completed by Pharmacy    See [facility] Admission Navigator for allergy information, preferred outpatient pharmacy, prior to admission medications and immunization status.     Medication history sources:  Patient via phone, MAR report, 11/3 Discharge Summary from [facility], Chloe dispense report     Per [place] :     Eszopiclone 3 mg tablets #30 for 30 day supply - last filled 11/19/20     The rest of the  fill history is repeats of this medication     Changes made to PTA medication list (reason)  Added: N/A  Deleted:     Vortioxetine 20 mg tablet - per patient, this medication was ineffective and stopped taking it 2-3 weeks ago   Changed: N/A    Additional medication history information:   - Patient was a good historian and denies taking any additional Rx/OTC medications other than the ones listed below.  - Of note, it has been about 2 weeks since patient last took his losartan/HCTZ medication for his hypertension.     Prior to Admission medications    Medication Sig Last Dose Taking? Auth Provider   eszopiclone (LUNESTA) 3 MG tablet Take 3 mg by mouth At Bedtime Past Week Yes Unknown, Entered By History   fenofibrate (TRIGLIDE) 160 MG tablet Take 1 tablet (160 mg) by mouth daily with food. 11/25/2020 Yes Harriet Segal MD   ibuprofen (ADVIL/MOTRIN) 200 MG tablet Take 800 mg by mouth every morning Past Week Yes Unknown, Entered By History   losartan-hydrochlorothiazide (HYZAAR) 50-12.5 MG tablet Take 1 tablet by mouth daily Past Month Yes Unknown, Entered By History   oxymetazoline (AFRIN 12 HOUR) 0.05 % nasal spray Spray 2 sprays into both nostrils At Bedtime 11/24/2020 Yes Unknown, Entered By History   vitamin D3 (CHOLECALCIFEROL) 2000 units (50 mcg) tablet Take 1 tablet by mouth daily 11/25/2020 Yes Unknown, Entered By History          Date completed: 11/25/20    Medication history completed by:   Yuki Jones, DerrellD   PGY-1 Pharmacy Resident - Behavioral Health  Services   [facility]  [phone]
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
## 2020-11-26 ENCOUNTER — APPOINTMENT (OUTPATIENT)
Dept: PHYSICAL THERAPY | Facility: CLINIC | Age: 60
End: 2020-11-26
Attending: PHYSICIAN ASSISTANT
Payer: COMMERCIAL

## 2020-11-26 LAB
BASOPHILS # BLD AUTO: 0.1 10E9/L (ref 0–0.2)
BASOPHILS NFR BLD AUTO: 1.2 %
DIFFERENTIAL METHOD BLD: ABNORMAL
EOSINOPHIL # BLD AUTO: 0.2 10E9/L (ref 0–0.7)
EOSINOPHIL NFR BLD AUTO: 3.6 %
ERYTHROCYTE [DISTWIDTH] IN BLOOD BY AUTOMATED COUNT: 15.1 % (ref 10–15)
HCT VFR BLD AUTO: 31.1 % (ref 40–53)
HGB BLD-MCNC: 9.3 G/DL (ref 13.3–17.7)
IMM GRANULOCYTES # BLD: 0 10E9/L (ref 0–0.4)
IMM GRANULOCYTES NFR BLD: 0.3 %
LYMPHOCYTES # BLD AUTO: 2 10E9/L (ref 0.8–5.3)
LYMPHOCYTES NFR BLD AUTO: 29.3 %
MCH RBC QN AUTO: 22 PG (ref 26.5–33)
MCHC RBC AUTO-ENTMCNC: 29.9 G/DL (ref 31.5–36.5)
MCV RBC AUTO: 74 FL (ref 78–100)
MONOCYTES # BLD AUTO: 0.6 10E9/L (ref 0–1.3)
MONOCYTES NFR BLD AUTO: 8.5 %
NEUTROPHILS # BLD AUTO: 3.9 10E9/L (ref 1.6–8.3)
NEUTROPHILS NFR BLD AUTO: 57.1 %
NRBC # BLD AUTO: 0 10*3/UL
NRBC BLD AUTO-RTO: 0 /100
PLATELET # BLD AUTO: 255 10E9/L (ref 150–450)
RBC # BLD AUTO: 4.23 10E12/L (ref 4.4–5.9)
WBC # BLD AUTO: 6.7 10E9/L (ref 4–11)

## 2020-11-26 PROCEDURE — 97110 THERAPEUTIC EXERCISES: CPT | Mod: GP

## 2020-11-26 PROCEDURE — 97161 PT EVAL LOW COMPLEX 20 MIN: CPT | Mod: GP

## 2020-11-26 PROCEDURE — 250N000013 HC RX MED GY IP 250 OP 250 PS 637: Performed by: PHYSICIAN ASSISTANT

## 2020-11-26 PROCEDURE — 250N000013 HC RX MED GY IP 250 OP 250 PS 637: Performed by: NURSE PRACTITIONER

## 2020-11-26 PROCEDURE — 124N000003 HC R&B MH SENIOR/ADOLESCENT

## 2020-11-26 PROCEDURE — 36415 COLL VENOUS BLD VENIPUNCTURE: CPT | Performed by: PHYSICIAN ASSISTANT

## 2020-11-26 PROCEDURE — 250N000013 HC RX MED GY IP 250 OP 250 PS 637: Performed by: PSYCHIATRY & NEUROLOGY

## 2020-11-26 PROCEDURE — 85025 COMPLETE CBC W/AUTO DIFF WBC: CPT | Performed by: PHYSICIAN ASSISTANT

## 2020-11-26 PROCEDURE — H2032 ACTIVITY THERAPY, PER 15 MIN: HCPCS

## 2020-11-26 RX ADMIN — BUSPIRONE HYDROCHLORIDE 5 MG: 5 TABLET ORAL at 15:01

## 2020-11-26 RX ADMIN — OXYMETAZOLINE HYDROCHLORIDE 2 SPRAY: 0.05 SPRAY NASAL at 22:23

## 2020-11-26 RX ADMIN — BUSPIRONE HYDROCHLORIDE 5 MG: 5 TABLET ORAL at 08:34

## 2020-11-26 RX ADMIN — BACITRACIN ZINC: 500 OINTMENT TOPICAL at 08:35

## 2020-11-26 RX ADMIN — Medication 50 MCG: at 08:34

## 2020-11-26 RX ADMIN — LOSARTAN POTASSIUM 25 MG: 25 TABLET, FILM COATED ORAL at 08:34

## 2020-11-26 RX ADMIN — FENOFIBRATE 160 MG: 160 TABLET ORAL at 08:34

## 2020-11-26 RX ADMIN — FERROUS GLUCONATE 324 MG: 324 TABLET ORAL at 08:34

## 2020-11-26 RX ADMIN — Medication 25 MG: at 08:34

## 2020-11-26 RX ADMIN — MELATONIN TAB 3 MG 3 MG: 3 TAB at 01:39

## 2020-11-26 RX ADMIN — DESVENLAFAXINE SUCCINATE 50 MG: 50 TABLET, EXTENDED RELEASE ORAL at 08:34

## 2020-11-26 RX ADMIN — BUSPIRONE HYDROCHLORIDE 5 MG: 5 TABLET ORAL at 22:22

## 2020-11-26 RX ADMIN — LIOTHYRONINE SODIUM 25 MCG: 25 TABLET ORAL at 08:34

## 2020-11-26 RX ADMIN — Medication 500 MG: at 08:35

## 2020-11-26 ASSESSMENT — ACTIVITIES OF DAILY LIVING (ADL)
HYGIENE/GROOMING: INDEPENDENT
LAUNDRY: UNABLE TO COMPLETE
ORAL_HYGIENE: INDEPENDENT
DRESS: INDEPENDENT

## 2020-11-26 ASSESSMENT — MIFFLIN-ST. JEOR: SCORE: 1786.94

## 2020-11-26 NOTE — PROGRESS NOTES
11/26/20 1200   Quick Adds   Type of Visit Initial PT Evaluation       Present no   Language English   Living Environment   Home Accessibility no concerns   Living Environment Comments Pt has no concerns regarding his living environment, safety at home. Reports fall was while intoxicated and does not anticipate falls will be a problem while not intoxicated.   Self-Care   Usual Activity Tolerance good   Current Activity Tolerance good   Equipment Currently Used at Home none   Disability/Function   Difficulty Communicating no   Walking or Climbing Stairs Difficulty no   Fall history within last six months yes   Number of times patient has fallen within last six months 2   Change in Functional Status Since Onset of Current Illness/Injury no   General Information   Onset of Illness/Injury or Date of Surgery 11/25/20   Referring Physician Maria Del Carmen Underwood PA-C   Patient/Family Therapy Goals Statement (PT) Pt reports no goals   Pertinent History of Current Problem (include personal factors and/or comorbidities that impact the POC) He was admitted to the hospital through the emergency room where he presented at [facility], with having cut his leg a week before to see what would happen and due to increased depression. Pt had been drinking a week ago intermittantly   Existing Precautions/Restrictions no known precautions/restrictions   Weight-Bearing Status - LUE full weight-bearing   Weight-Bearing Status - RUE full weight-bearing   Weight-Bearing Status - LLE full weight-bearing   Weight-Bearing Status - RLE full weight-bearing   General Observations Pt's mobility upon evaluation is good, no concerns.   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 job of working overhead however ROM WFL   Strength   Manual Muscle Testing Quick Adds Strength WNL   Bed Mobility   Comment (Bed Mobility) Did not assess   Transfers   Transfers sit-stand transfer   Transfer Safety Comments No concerns   Gait/Stairs (Locomotion)   [place] Level (Gait) independent   Balance   Balance no deficits were identified   Balance Comments Able to ambulate  while holding conversation and turning head R/L   Clinical Impression   Criteria for Skilled Therapeutic Intervention yes, treatment indicated   PT Diagnosis (PT) Decreased functional mobility   Influenced by the following impairments Rt shoulder pain   Functional limitations due to impairments Rt shoulder pain   Clinical Presentation Stable/Uncomplicated   Clinical Presentation Rationale Medical status   Clinical Decision Making (Complexity) low complexity   Therapy Frequency (PT) One time eval and treatment only   Predicted Duration of Therapy Intervention (days/wks) 1 day   Planned Therapy Interventions (PT) ROM (range of motion);stretching   Risk & Benefits of therapy have been explained evaluation/treatment results reviewed   Clinical Impression Comments Overall mobility wfl, no concerns however pt reports Rt shoudler pain interfering with job performance   PT Discharge Planning    PT Discharge Recommendation (DC Rec) home   PT Rationale for DC Rec Pt safe to DC home when medically stable   Total Evaluation Time   Total Evaluation Time (Minutes) 3

## 2020-11-26 NOTE — PLAN OF CARE
"\"Jason\" participated in Music Therapy group today.  Participated in Music Therapy intervention of [employer] [facility] today.  Goals of session were focusing, memory recall, positive distraction, emotional containment and social cohesion.  Jason presented with a more distant affect, not engaging with others during group time.  He did follow the intervention and stay for the duration, but appeared more \"in a fog\".  His affect was calm.       "

## 2020-11-26 NOTE — PROGRESS NOTES
Pt requested/given prn melatonin at 0130. Pt went back to back and been sleeping. No issues reported/noted. Will continue to monitor.

## 2020-11-26 NOTE — PLAN OF CARE
Pt continues on [facility] monitoring for alcohol withdrawal, scores this shift 1 and 1. Pt presents with a flat/blunt affect. Pt describes his mood as depressed, rates depression at a 6/10. Pt was isolative and withdrawn and in the afternoon prior to dinner. After dinner pt did participate in group, made phone call  To family member and watched TV in [employer]. Ate his entire dinner. Pt denies thoughts of SI or SIB, denies hallucinations. States his anxiety is improving as he learns what to expect here on the unit.

## 2020-11-26 NOTE — PLAN OF CARE
"Pt verbalized he has difficulty falling asleep last night. Pt unable to rate anxiety and depression, \"I am having a sad day\", pt verbalized he has a \"hard time\" distinguishing between anxiety and depression. Pt denies SI/SIB and hallucinations. Pt denies questions/concerns at this time.   "

## 2020-11-26 NOTE — PLAN OF CARE
Discharge Planner PT   Patient plan for discharge: did not discuss  Current status: Eval complete, treatment initiated and completed. Pt ambulates throughout hallway with independence, performing head turns and holding conversation - no concerns regarding mobility. Provided exercises for Rt shoulder due to shoulder pain - verbalizes understanding. Screened for OT, no OT needs at this time.   Barriers to return to prior living situation: None from PT standpoint  Recommendations for discharge: Home  Rationale for recommendations: Safe to discharge home from PT standpoint       Entered by: Manda Daily 11/26/2020 12:10 PM    .Physical Therapy Discharge Summary    Reason for therapy discharge:    All goals and outcomes met, no further needs identified.    Progress towards therapy goal(s). See goals on Care Plan in [facility] electronic health record for goal details.  Goals met    Therapy recommendation(s):    No further therapy is recommended.

## 2020-11-26 NOTE — PLAN OF CARE
Brief medicine note:  - Hgb slightly improved this am. BPs stable. No further medical intervention indicated at this time. Please instruct pt to follow up with PCP after discharge in 1-2 wks for referral to get repeat colonoscopy and/or referral to GI. Continue PTA iron supplementation in interim. Medicine signing off. Please feel free to call with questions.       Randy Ramos PA-C  Internal Medicine Hospitalist   [facility] Hospitalist group  [phone]

## 2020-11-26 NOTE — PLAN OF CARE
Problem: General Rehab Plan of Care  Goal: Therapeutic Recreation/Music Therapy Goal  Pt attended the structured Therapeutic Recreation group, participating in a group activity. Pt participated in group discussion, leisure participation, and social engagement to gain self-esteem, manage behaviors, improve social skills, decrease isolation, and reduce anxiety/depression.   Pt remained focused and engaged throughout group activity, but chose not to take any turns.  Pt actively contributed to the clues and descriptions throughout the activity.

## 2020-11-27 VITALS
WEIGHT: 208.1 LBS | RESPIRATION RATE: 16 BRPM | TEMPERATURE: 98.4 F | OXYGEN SATURATION: 97 % | BODY MASS INDEX: 28.19 KG/M2 | SYSTOLIC BLOOD PRESSURE: 128 MMHG | HEART RATE: 46 BPM | DIASTOLIC BLOOD PRESSURE: 75 MMHG | HEIGHT: 72 IN

## 2020-11-27 PROCEDURE — 250N000013 HC RX MED GY IP 250 OP 250 PS 637: Performed by: PHYSICIAN ASSISTANT

## 2020-11-27 PROCEDURE — G0177 OPPS/PHP; TRAIN & EDUC SERV: HCPCS

## 2020-11-27 PROCEDURE — 250N000013 HC RX MED GY IP 250 OP 250 PS 637: Performed by: NURSE PRACTITIONER

## 2020-11-27 PROCEDURE — 250N000013 HC RX MED GY IP 250 OP 250 PS 637: Performed by: PSYCHIATRY & NEUROLOGY

## 2020-11-27 RX ORDER — LIOTHYRONINE SODIUM 25 UG/1
25 TABLET ORAL DAILY
Qty: 90 TABLET | Refills: 1 | Status: SHIPPED | OUTPATIENT
Start: 2020-11-28

## 2020-11-27 RX ORDER — BUSPIRONE HYDROCHLORIDE 10 MG/1
10 TABLET ORAL 3 TIMES DAILY
Qty: 90 TABLET | Refills: 3 | Status: SHIPPED | OUTPATIENT
Start: 2020-11-27

## 2020-11-27 RX ORDER — GINSENG 100 MG
CAPSULE ORAL 2 TIMES DAILY
Qty: 15 G | Refills: 0 | Status: SHIPPED | OUTPATIENT
Start: 2020-11-27

## 2020-11-27 RX ORDER — FERROUS GLUCONATE 324(38)MG
324 TABLET ORAL
Qty: 90 TABLET | Refills: 3 | Status: SHIPPED | OUTPATIENT
Start: 2020-11-28

## 2020-11-27 RX ORDER — LANOLIN ALCOHOL/MO/W.PET/CERES
6 CREAM (GRAM) TOPICAL
Qty: 60 TABLET | Refills: 3 | Status: SHIPPED | OUTPATIENT
Start: 2020-11-27

## 2020-11-27 RX ORDER — DESVENLAFAXINE 50 MG/1
50 TABLET, FILM COATED, EXTENDED RELEASE ORAL DAILY
Qty: 30 TABLET | Refills: 3 | Status: SHIPPED | OUTPATIENT
Start: 2020-11-28

## 2020-11-27 RX ORDER — NALTREXONE HYDROCHLORIDE 50 MG/1
50 TABLET, FILM COATED ORAL DAILY
Qty: 30 TABLET | Refills: 3 | Status: SHIPPED | OUTPATIENT
Start: 2020-11-28

## 2020-11-27 RX ORDER — LOSARTAN POTASSIUM 25 MG/1
25 TABLET ORAL DAILY
Qty: 30 TABLET | Refills: 3 | Status: SHIPPED | OUTPATIENT
Start: 2020-11-28

## 2020-11-27 RX ORDER — AMOXICILLIN 250 MG
1 CAPSULE ORAL 2 TIMES DAILY PRN
Qty: 60 TABLET | Refills: 3 | Status: SHIPPED | OUTPATIENT
Start: 2020-11-27

## 2020-11-27 RX ORDER — BUSPIRONE HYDROCHLORIDE 10 MG/1
10 TABLET ORAL 3 TIMES DAILY
Status: DISCONTINUED | OUTPATIENT
Start: 2020-11-27 | End: 2020-11-27 | Stop reason: HOSPADM

## 2020-11-27 RX ORDER — FOLIC ACID 1 MG/1
TABLET ORAL
Qty: 150 TABLET | Refills: 3 | Status: SHIPPED | OUTPATIENT
Start: 2020-11-27

## 2020-11-27 RX ADMIN — FENOFIBRATE 160 MG: 160 TABLET ORAL at 08:05

## 2020-11-27 RX ADMIN — LOSARTAN POTASSIUM 25 MG: 25 TABLET, FILM COATED ORAL at 08:05

## 2020-11-27 RX ADMIN — Medication 500 MG: at 08:04

## 2020-11-27 RX ADMIN — Medication 25 MG: at 08:05

## 2020-11-27 RX ADMIN — BUSPIRONE HYDROCHLORIDE 10 MG: 10 TABLET ORAL at 13:51

## 2020-11-27 RX ADMIN — FERROUS GLUCONATE 324 MG: 324 TABLET ORAL at 08:05

## 2020-11-27 RX ADMIN — LIOTHYRONINE SODIUM 25 MCG: 25 TABLET ORAL at 08:05

## 2020-11-27 RX ADMIN — Medication 50 MCG: at 08:05

## 2020-11-27 RX ADMIN — BUSPIRONE HYDROCHLORIDE 5 MG: 5 TABLET ORAL at 08:05

## 2020-11-27 RX ADMIN — DESVENLAFAXINE SUCCINATE 50 MG: 50 TABLET, EXTENDED RELEASE ORAL at 08:05

## 2020-11-27 ASSESSMENT — ACTIVITIES OF DAILY LIVING (ADL)
ORAL_HYGIENE: INDEPENDENT
HYGIENE/GROOMING: INDEPENDENT
LAUNDRY: UNABLE TO COMPLETE
DRESS: INDEPENDENT

## 2020-11-27 NOTE — PROGRESS NOTES
Work Completed: Chart review  Made the following appointments and added to [facility]  Psychiatry:   Date/Time Patient to call to schedule  Provider:  Dr. Jose Hill @ [facility]  Address:  [address], [address] [place], [place] [address]  [address]  Phone:  [phone], Fax:  [phone]  You are discharging on a day when your provider's office is closed.  Please contact them on Monday and schedule a hospitalization follow-up appointment for sometime in the next 2-3 weeks.      Individual Therapy  Date/Time: Saturday November 28th at 10:00am  Provider: Sandra Willard @ [facility]  Address: [address]  Phone: [phone]  You are discharging on a day that many offices are closed for the holiday.  This appointment was found outside the [facility] system.  Please contact this provider to determine whether this is a telehealth or video appointment platform and to confirm that this provider can accept your insurance and  If not, they should be able to connect you with another provider.    [facility] complete    Discharge plan or goal: Discharge today at 5pm - home to follow up with outpatient providers for Therapy and Medication mgmt                Barriers to discharge: None

## 2020-11-27 NOTE — PLAN OF CARE
Pt verbalized his sleep is improving. Endorses depression and anxiety, denies SI/SIB. Flat affect (brightens on approach), mood calm. Pt feels ready for discharge today, plan is for his daughter to pick him up at 1700.

## 2020-11-27 NOTE — PLAN OF CARE
Pt discharged to home with daughter. Denies SI/HI/SIB. Belongings accounted for and returned to pt. Discharge summary reviewed. Outpatient crisis resources identified. Able to identify sources of support and effective coping skills. Verbalizes understanding of all discharge instructions, recommendations and medications.

## 2020-11-27 NOTE — PLAN OF CARE
Pt isolative to room. Appears somnolent. Affect flat, sad. Makes fleeting eye contact. Reports depressive symptoms but unable to rate. Denies anxiety. Denies SI and agrees to inform staff if he has any. Polite, pleasant, cooperative. Med-compliant. Denies any pain or physical complaints. No observable symptoms of withdrawal. Continue with current treatment plan and recommendations. Continue to monitor and reassess symptoms. Monitor response to medications. Monitor progress towards treatment goals. Encourage groups and participation.

## 2020-11-27 NOTE — DISCHARGE SUMMARY
Admitted:     11/25/2020      Mr. Bull Garza, date of birth, 1960, is a 60-year-old man admitted to [facility] Psychiatry unit on 11/24/2020.      He was admitted to the hospital through the emergency room where he presented at [facility], with having cut his leg a week before to see what would happen and due to increased depression.      He also had been drinking about a week ago intermittently.      MEDICATIONS:  Current prescribed medications, which he has not been regularly taking at this time include Lunesta, triglyceride, Hyzaar, Trintellix, Zocor, Viibryd, Effexor, Lamictal, and Microzide.  This was from the emergency room note.  As far as I can determine his actual current medications prescribed were combination of Pristiq and Trintellix.      MEDICAL HISTORY:  Noted for depression, treatment resistant, hypertension, migraines, history of subdural, puncture wound of the leg, obstructive sleep apnea, hyperlipidemia and anemia.        SOCIAL HISTORY:  He is not a smoker.  He drinks occasionally, he is  and there is current stress as his wife has stage IV breast cancer.      REVIEW OF SYSTEMS:  A 10-point system review in the emergency room was negative except for the psychiatric issues.      PHYSICAL EXAMINATION:    VITAL SIGNS:  Blood pressure 134/56, temperature 98.5, pulse 50, respirations 18, SpO2 99.  He is 6 feet tall.      Physical examination was otherwise unremarkable.  Affect was flat and depressed.      DEC assessment was reviewed.  It noted that he has not been sleeping well in part due to the fact that he switched his work to nights to be less exposed to COVID with his wife being immunosuppressed.      He has been a patient of Dr. Jose Hill, but has not seen him since January due to the COVID.  He stopped his medications due to sexual dysfunction, which he thought might be due to the medicines; however, his functioning did not improve without it.   He has depression symptoms of hopelessness, worthlessness, sad mood, lack of interest in things, lack of concentration, and decreased appetite as well as a history of increased self-harm thoughts.  Depression started in 1990 when he was in his 40s.  He believes the stressor involved was ongoing issues with his daughter who has autism spectrum disease.  Previous medications included Prozac, Paxil, Zoloft, Serzone, Effexor, Lexapro, Celexa, mirtazapine, Wellbutrin, trazodone, Pristiq, Viibryd, Trintellix, and not MAO inhibitor or Fetzima.  He has also taken nortriptyline.  He has taken Lamictal, Geodon, gabapentin he thinks, Seroquel, Abilify, Latuda, and Risperdal.      He believes the Trintellix helped partially and the Pristiq helped partially.      FAMILY HISTORY:  Negative.        QTc on last EKG was 357.  He was hospitalized also in early November due to an overdose of Lunesta and morphine for about 3 days.      LABORATORY DATA:  Review shows comprehensive profile unremarkable, B12 of 428.  TSH of 1.52, hemoglobin 8.6, hematocrit 28.4, red blood cell count 3.8, MCV slightly low at 75.  COVID is negative, and toxicology is negative.  CT scan of the head was done today due to a fall approximately 10 days ago.      He was previously at [facility] in 2017 and 2019 in addition to the hospitalization referenced above.      MENTAL STATUS EXAMINATION:  Shows an alert, cooperative man sitting in bed.  General appearance is good, eye contact with the computer is good as this was a telemedicine eval, speech was normal, Thought process is linear without signs of psychosis.  He denies currently planning suicide, he shows no signs of hallucinations or delusions.  Insight is fair, judgment is fair.  Associations are intact.  Orientation is normal.  Memory is normal.  Language use is normal.  Mood is depressed.  Affect is sad.  Gait was not examined, but no abnormal movements were noted.      DIAGNOSTIC IMPRESSION:    1.  Major depressive disorder, severe, recurrent without psychotic features.   2.  Borderline personality disorder by history.      PLAN:  Plan at this time is to restart the Pristiq, but hold off on the Trintellix due to the history of sexual dysfunction.  BuSpar will be added for anxiety, which hits one of the receptors or Trintellix that Trintellix hits.  Augmentation with Cytomel will occur.  I considered Lithium, which he has not tried either.  Naltrexone will be added for self-harm urges.      Will gear for a 3-4 day stay to stabilize as he wants to be home to help care for his wife and help with his daughter's care.  This evaluation was done via the [employer] due to the COVID pandemic and lasted half an hour.      Hospital course:  He improved.  Folic acid 5mg daily was added at discharge due to elevated homocysteine levels.  He was discharged to home      Current Facility-Administered Medications:      acetaminophen (TYLENOL) tablet 650 mg, 650 mg, Oral, Q4H PRN, Lennie Ryan APRN CNP     alum & mag hydroxide-simethicone (MAALOX) suspension 30 mL, 30 mL, Oral, Q4H PRN, Lennie Ryan APRN CNP     ascorbic acid (vitamin C) chewable tablet 500 mg, 500 mg, Oral, Daily, Maria Del Carmen Underwood PA-C, 500 mg at 11/27/20 0804     bacitracin ointment, , Topical, BID, Maria Del Carmen Underwood PA-C, Given at 11/26/20 0835     busPIRone (BUSPAR) tablet 10 mg, 10 mg, Oral, TID, David Christianson MD, 10 mg at 11/27/20 1351     desvenlafaxine (PRISTIQ) 24 hr tablet 50 mg, 50 mg, Oral, Daily, David Christianson MD, 50 mg at 11/27/20 0805     fenofibrate (TRIGLIDE/LOFIBRA) tablet 160 mg, 160 mg, Oral, Daily with breakfast, Lennie Ryan APRN CNP, 160 mg at 11/27/20 0805     ferrous gluconate (FERGON) tablet 324 mg, 324 mg, Oral, Daily with breakfast, Maria Del Carmen Underwood PA-C, 324 mg at 11/27/20 0805     gabapentin (NEURONTIN) capsule 100 mg, 100 mg, Oral, Q6H PRN, Lennie Ryan, APRN CNP     ibuprofen  (ADVIL/MOTRIN) tablet 600 mg, 600 mg, Oral, TID PRN, Maria Del Carmen Underwood PA-C     liothyronine (CYTOMEL) tablet 25 mcg, 25 mcg, Oral, Daily, David Christianson MD, 25 mcg at 11/27/20 0805     LORazepam (ATIVAN) tablet 1-4 mg, 1-4 mg, Oral, Q30 Min PRN, Lennie Ryan APRN CNP     losartan (COZAAR) tablet 25 mg, 25 mg, Oral, Daily, Maria Del Carmen Underwood PA-C, 25 mg at 11/27/20 0805     melatonin tablet 3 mg, 3 mg, Oral, At Bedtime PRN, Lennie Ryan APRN CNP, 3 mg at 11/26/20 0139     naltrexone (DEPADE;REVIA) half-tab 25 mg, 25 mg, Oral, Daily, David Christianson MD, 25 mg at 11/27/20 0805     OLANZapine (zyPREXA) tablet 10 mg, 10 mg, Oral, TID PRN **OR** OLANZapine (zyPREXA) injection 10 mg, 10 mg, Intramuscular, TID PRN, Lennie Ryan APRN CNP     oxymetazoline (AFRIN) 0.05 % spray 2 spray, 2 spray, Both Nostrils, At Bedtime, Lennie Ryan APRN CNP, 2 spray at 11/26/20 2223     senna-docusate (SENOKOT-S/PERICOLACE) 8.6-50 MG per tablet 1 tablet, 1 tablet, Oral, BID PRN, Lennie Ryan APRN CNP     Vitamin D3 (CHOLECALCIFEROL) tablet 50 mcg, 50 mcg, Oral, Daily, Lennie Ryan APRN CNP, 50 mcg at 11/27/20 0805  Recent Results (from the past 168 hour(s))   Asymptomatic COVID-19 Virus (Coronavirus) by PCR    Collection Time: 11/24/20  8:29 PM    Specimen: Nasopharyngeal   Result Value Ref Range    COVID-19 Virus PCR to [facility] - Source Nasopharyngeal     COVID-19 Virus PCR to [facility] - Result       Test received-See reflex to IDDL test SARS CoV2 (COVID-19) Virus RT-PCR   SARS-CoV-2 COVID-19 Virus (Coronavirus) RT-PCR Nasopharyngeal    Collection Time: 11/24/20  8:29 PM    Specimen: Nasopharyngeal   Result Value Ref Range    SARS-CoV-2 Virus Specimen Source Nasopharyngeal     SARS-CoV-2 PCR Result NEGATIVE     SARS-CoV-2 PCR Comment       Testing was performed using the Aptima SARS-CoV-2 Assay on the [employer] Instrument System.   Additional information about this Emergency  Use Authorization (EUA) assay can be found via   the Lab Guide.     Drug abuse screen urine    Collection Time: 11/24/20 10:45 PM   Result Value Ref Range    Amphetamine Qual Urine Negative NEG^Negative    Barbiturates Qual Urine Negative NEG^Negative    Benzodiazepine Qual Urine Negative NEG^Negative    Cannabinoids Qual Urine Negative NEG^Negative    Cocaine Qual Urine Negative NEG^Negative    Opiates Qualitative Urine Negative NEG^Negative    PCP Qual Urine Negative NEG^Negative   Lipid panel    Collection Time: 11/25/20  7:14 AM   Result Value Ref Range    Cholesterol 185 <200 mg/dL    Triglycerides 86 <150 mg/dL    HDL Cholesterol 39 (L) >39 mg/dL    LDL Cholesterol Calculated 129 (H) <100 mg/dL    Non HDL Cholesterol 146 (H) <130 mg/dL   TSH with free T4 reflex and/or T3 as indicated    Collection Time: 11/25/20  7:14 AM   Result Value Ref Range    TSH 1.52 0.40 - 4.00 mU/L   Vitamin B12    Collection Time: 11/25/20  7:14 AM   Result Value Ref Range    Vitamin B12 428 193 - 986 pg/mL   Vitamin D    Collection Time: 11/25/20  7:14 AM   Result Value Ref Range    Vitamin D Deficiency screening 26 20 - 75 ug/L   CBC with platelets    Collection Time: 11/25/20  7:14 AM   Result Value Ref Range    WBC 5.7 4.0 - 11.0 10e9/L    RBC Count 3.80 (L) 4.4 - 5.9 10e12/L    Hemoglobin 8.6 (L) 13.3 - 17.7 g/dL    Hematocrit 28.4 (L) 40.0 - 53.0 %    MCV 75 (L) 78 - 100 fl    MCH 22.6 (L) 26.5 - 33.0 pg    MCHC 30.3 (L) 31.5 - 36.5 g/dL    RDW 15.1 (H) 10.0 - 15.0 %    Platelet Count 226 150 - 450 10e9/L   Comprehensive metabolic panel    Collection Time: 11/25/20  7:14 AM   Result Value Ref Range    Sodium 143 133 - 144 mmol/L    Potassium 4.0 3.4 - 5.3 mmol/L    Chloride 111 (H) 94 - 109 mmol/L    Carbon Dioxide 25 20 - 32 mmol/L    Anion Gap 7 3 - 14 mmol/L    Glucose 98 70 - 99 mg/dL    Urea Nitrogen 15 7 - 30 mg/dL    Creatinine 0.72 0.66 - 1.25 mg/dL    GFR Estimate >90 >60 mL/min/[1.73_m2]    GFR Estimate If Black >90  >60 mL/min/[1.73_m2]    Calcium 8.3 (L) 8.5 - 10.1 mg/dL    Bilirubin Total 0.8 0.2 - 1.3 mg/dL    Albumin 3.6 3.4 - 5.0 g/dL    Protein Total 6.3 (L) 6.8 - 8.8 g/dL    Alkaline Phosphatase 40 40 - 150 U/L    ALT 20 0 - 70 U/L    AST 12 0 - 45 U/L   CBC with platelets differential    Collection Time: 11/26/20  8:12 AM   Result Value Ref Range    WBC 6.7 4.0 - 11.0 10e9/L    RBC Count 4.23 (L) 4.4 - 5.9 10e12/L    Hemoglobin 9.3 (L) 13.3 - 17.7 g/dL    Hematocrit 31.1 (L) 40.0 - 53.0 %    MCV 74 (L) 78 - 100 fl    MCH 22.0 (L) 26.5 - 33.0 pg    MCHC 29.9 (L) 31.5 - 36.5 g/dL    RDW 15.1 (H) 10.0 - 15.0 %    Platelet Count 255 150 - 450 10e9/L    Diff Method Automated Method     % Neutrophils 57.1 %    % Lymphocytes 29.3 %    % Monocytes 8.5 %    % Eosinophils 3.6 %    % Basophils 1.2 %    % Immature Granulocytes 0.3 %    Nucleated RBCs 0 0 /100    Absolute Neutrophil 3.9 1.6 - 8.3 10e9/L    Absolute Lymphocytes 2.0 0.8 - 5.3 10e9/L    Absolute Monocytes 0.6 0.0 - 1.3 10e9/L    Absolute Eosinophils 0.2 0.0 - 0.7 10e9/L    Absolute Basophils 0.1 0.0 - 0.2 10e9/L    Abs Immature Granulocytes 0.0 0 - 0.4 10e9/L    Absolute Nucleated RBC 0.0      11/27: Blood pressure 128/75, pulse (!) 46, temperature 98.4  F (36.9  C), temperature source Oral, resp. rate 16, height 1.829 m (6'), weight 94.4 kg (208 lb 1.6 oz), SpO2 97 %.    11/27: General appearance: good  Alert.   Affect: good  Mood: good    Speech:  normal.   Eye contact:  good.    Psychomotor behavior: normal  Gait: normal.    Abnormal movements: none  Delusions: none  Hallucinations:  none  Thoughts: logical  Associations: intact  Judgement: good  Insight: good  Cognitions: intact in conversation  Memory:  intact in conversation  Orientation: normal    Not suicidal.      Video-Visit Details    Type of service:  Video Visit    Video Start Time (time video started): 1300    Video End Time (time video stopped): 1320    Originating Location (pt. Location):  [facility]    Distant Location (provider location): Provider remote location    Mode of Communication:  Video Conference via [employer]    Physician has received verbal consent for a Video Visit from the patient? Yes      David Christianson MD

## 2020-11-27 NOTE — DISCHARGE INSTRUCTIONS
Behavioral Discharge Planning and Instructions      Summary:  You were admitted on 11/25/2020  due to Depression, Suicidal Ideations and Self Injurious Behaviors.  You were treated by Dr. David Christianson MD and discharged on 11/27/20 from [facility] to Home      Principal Diagnosis:   1.  Major depressive disorder, severe, recurrent without psychotic features.   2.  Borderline personality disorder by history.    Health Care Follow-up Appointments:   Psychiatry:   Date/Time Patient to call to schedule  Provider:  Dr. Jose Hill @ [facility]  Address:  [address], [place] [address]  Phone:  [phone], Fax:  [phone]  You are discharging on a day when your provider's office is closed.  Please contact them on Monday and schedule a hospitalization follow-up appointment for sometime in the next 2-3 weeks.      Individual Therapy  Date/Time: Saturday November 28th at 10:00am  Provider: Sandra Willard @ [facility]  Address: [address]  Phone: [phone]  You are discharging on a day that many offices are closed for the holiday.  This appointment was found outside the [employer] system.  Please contact this provider to determine whether this is a telehealth or video appointment platform and to confirm that this provider can accept your insurance and  If not, they should be able to connect you with another provider.  Attend all scheduled appointments with your outpatient providers. Call at least 24 hours in advance if you need to reschedule an appointment to ensure continued access to your outpatient providers.   Lifestyle Adjustment:   1. Adjust your lifestyle to get enough sleep, relaxation, exercise and good nutrition.  Continue to develop healthy coping skills to decrease stress and promote a healthy  lifestyle.  2. Abstain from all substances of abuse.  3. Take medications as prescribed.  Please work with your doctor to discuss  any concerns you have with your medications or side effects you may be experiencing.  4. Follow up with appointments as scheduled.      General Medication Instructions:   1. See your medication sheet(s) for instructions.   2. Take all medicines as directed.  Make no changes unless your doctor suggests them.   3. Go to all your doctor visits.  4. Be sure to have all your required lab tests. This way, your medicines can be refilled on time.  5. Do not use any drugs not prescribed by your doctor.  Major Treatments, Procedures and Findings:  You were provided with: a psychiatric assessment, assessed for medical stability, medication evaluation and/or management, group therapy, milieu management and medical interventions    Symptoms to Report: feeling more aggressive, increased confusion, losing more sleep, mood getting worse or thoughts of suicide    Early warning signs can include: increased depression or anxiety sleep disturbances increased thoughts or behaviors of suicide or self-harm  increased unusual thinking, such as paranoia or hearing voices    Safety and Wellness:  Take all medicines as directed.  Make no changes unless your doctor suggests them.      Follow treatment recommendations.  Refrain from alcohol and non-prescribed drugs.  If there is a concern for safety, call 911.    Resources:   Crisis Intervention: 585.229.3753 or 359-195-4625 (TTY: 808.228.1681).  Call anytime for help.  [place] [employer] Mental Illness (www.mn.sidney.org): 186.549.2512 or 250-102-1080.  Suicide Awareness Voices of Education (SAVE) (www.save.org): 985-616-XTCA (5141)  National Suicide Prevention Line (www.mentalhealthmn.org): 727-319-CPTC (0824)  Self- Management and Recovery Training., SMART-- Toll free: 974.103.4524  www.[employer].org  [facility] Crisis Response [phone]      The treatment team has appreciated the opportunity to work with you.     If you have any questions or concerns our unit number is [phone]  [phone]. If you would like to obtain any specific documentation regarding your hospitalization after your discharge, contact [facility] of Information/Medical Records at:   [phone].

## 2021-12-18 ENCOUNTER — HEALTH MAINTENANCE LETTER (OUTPATIENT)
Age: 61
End: 2021-12-18

## 2022-10-10 ENCOUNTER — HEALTH MAINTENANCE LETTER (OUTPATIENT)
Age: 62
End: 2022-10-10

## 2023-03-25 ENCOUNTER — HEALTH MAINTENANCE LETTER (OUTPATIENT)
Age: 63
End: 2023-03-25

## 2023-04-26 NOTE — PROCEDURES
[facility] ECT Procedure Note     Bull Garza [id number]   59 year old 1960     Patient Status: Outpatient    Allergies   Allergen Reactions     Nka [No Known Allergies]        Weight:  0 lbs 0 oz              Diagnosis:   Major depression       Indications for ECT:   Medications ineffective       Pause for the Cause:     Right patient Yes   Right procedure/laterality settings: Yes   Right diagnosis Yes          Intra-Procedure Documentation:     Date:  7/58/2019  Time:  6:22 AM    ECT #    Treatment number this series: 5   Total treatment number: 5   Type of ECT:  Bilateral, standard    ECT Medications administered: See MAR and Anesthesia record         Clinical Narrative:     ECT was administered by Thymatron machine.  Pt has been medically cleared for procedure, consent signed. Side effects, Risks and benefits reviewed.    ECT Strip Summary:   Energy Level: 90 percent  Motor Seizure Duration: 30 seconds  EEG Seizure Duration: 30 seconds    Complications: No    Plan: 6th ECT 7/10/19  Outpatient Psychiatrist: Dr Hill    
no

## (undated) RX ORDER — KETOROLAC TROMETHAMINE 30 MG/ML
INJECTION, SOLUTION INTRAMUSCULAR; INTRAVENOUS
Status: DISPENSED
Start: 2019-07-08

## (undated) RX ORDER — LABETALOL HYDROCHLORIDE 5 MG/ML
INJECTION, SOLUTION INTRAVENOUS
Status: DISPENSED
Start: 2017-03-15

## (undated) RX ORDER — KETOROLAC TROMETHAMINE 30 MG/ML
INJECTION, SOLUTION INTRAMUSCULAR; INTRAVENOUS
Status: DISPENSED
Start: 2017-03-17

## (undated) RX ORDER — KETOROLAC TROMETHAMINE 30 MG/ML
INJECTION, SOLUTION INTRAMUSCULAR; INTRAVENOUS
Status: DISPENSED
Start: 2017-03-13

## (undated) RX ORDER — ONDANSETRON 2 MG/ML
INJECTION INTRAMUSCULAR; INTRAVENOUS
Status: DISPENSED
Start: 2017-03-13

## (undated) RX ORDER — KETOROLAC TROMETHAMINE 30 MG/ML
INJECTION, SOLUTION INTRAMUSCULAR; INTRAVENOUS
Status: DISPENSED
Start: 2019-07-10

## (undated) RX ORDER — KETOROLAC TROMETHAMINE 30 MG/ML
INJECTION, SOLUTION INTRAMUSCULAR; INTRAVENOUS
Status: DISPENSED
Start: 2017-03-24

## (undated) RX ORDER — ONDANSETRON 2 MG/ML
INJECTION INTRAMUSCULAR; INTRAVENOUS
Status: DISPENSED
Start: 2017-03-24

## (undated) RX ORDER — ONDANSETRON 2 MG/ML
INJECTION INTRAMUSCULAR; INTRAVENOUS
Status: DISPENSED
Start: 2017-03-15

## (undated) RX ORDER — ONDANSETRON 2 MG/ML
INJECTION INTRAMUSCULAR; INTRAVENOUS
Status: DISPENSED
Start: 2017-03-17

## (undated) RX ORDER — KETOROLAC TROMETHAMINE 30 MG/ML
INJECTION, SOLUTION INTRAMUSCULAR; INTRAVENOUS
Status: DISPENSED
Start: 2017-03-15

## (undated) RX ORDER — LABETALOL HYDROCHLORIDE 5 MG/ML
INJECTION, SOLUTION INTRAVENOUS
Status: DISPENSED
Start: 2017-03-24